# Patient Record
Sex: FEMALE | Race: WHITE | HISPANIC OR LATINO | Employment: OTHER | URBAN - METROPOLITAN AREA
[De-identification: names, ages, dates, MRNs, and addresses within clinical notes are randomized per-mention and may not be internally consistent; named-entity substitution may affect disease eponyms.]

---

## 2019-05-28 ENCOUNTER — HOSPITAL ENCOUNTER (EMERGENCY)
Facility: HOSPITAL | Age: 44
Discharge: HOME | End: 2019-05-28
Attending: EMERGENCY MEDICINE | Admitting: EMERGENCY MEDICINE

## 2019-05-28 ENCOUNTER — APPOINTMENT (OUTPATIENT)
Dept: CT IMAGING | Facility: HOSPITAL | Age: 44
End: 2019-05-28

## 2019-05-28 VITALS
HEART RATE: 67 BPM | OXYGEN SATURATION: 99 % | RESPIRATION RATE: 16 BRPM | DIASTOLIC BLOOD PRESSURE: 65 MMHG | WEIGHT: 135 LBS | SYSTOLIC BLOOD PRESSURE: 111 MMHG | TEMPERATURE: 97.7 F | HEIGHT: 57 IN | BODY MASS INDEX: 29.12 KG/M2

## 2019-05-28 DIAGNOSIS — D25.9 UTERINE LEIOMYOMA, UNSPECIFIED LOCATION: Primary | ICD-10-CM

## 2019-05-28 LAB
ALBUMIN SERPL-MCNC: 4.2 G/DL (ref 3.5–5.2)
ALBUMIN/GLOB SERPL: 1.6 G/DL
ALP SERPL-CCNC: 70 U/L (ref 39–117)
ALT SERPL W P-5'-P-CCNC: 35 U/L (ref 1–33)
ANION GAP SERPL CALCULATED.3IONS-SCNC: 11.2 MMOL/L
AST SERPL-CCNC: 33 U/L (ref 1–32)
BASOPHILS # BLD AUTO: 0.03 10*3/MM3 (ref 0–0.2)
BASOPHILS NFR BLD AUTO: 0.6 % (ref 0–1.5)
BILIRUB SERPL-MCNC: <0.2 MG/DL (ref 0.2–1.2)
BILIRUB UR QL STRIP: NEGATIVE
BUN BLD-MCNC: 10 MG/DL (ref 6–20)
BUN/CREAT SERPL: 14.3 (ref 7–25)
CALCIUM SPEC-SCNC: 8.9 MG/DL (ref 8.6–10.5)
CHLORIDE SERPL-SCNC: 104 MMOL/L (ref 98–107)
CLARITY UR: CLEAR
CO2 SERPL-SCNC: 24.8 MMOL/L (ref 22–29)
COLOR UR: YELLOW
CREAT BLD-MCNC: 0.7 MG/DL (ref 0.57–1)
DEPRECATED RDW RBC AUTO: 41.9 FL (ref 37–54)
EOSINOPHIL # BLD AUTO: 0.11 10*3/MM3 (ref 0–0.4)
EOSINOPHIL NFR BLD AUTO: 2.1 % (ref 0.3–6.2)
ERYTHROCYTE [DISTWIDTH] IN BLOOD BY AUTOMATED COUNT: 12.9 % (ref 12.3–15.4)
GFR SERPL CREATININE-BSD FRML MDRD: 90 ML/MIN/1.73
GLOBULIN UR ELPH-MCNC: 2.6 GM/DL
GLUCOSE BLD-MCNC: 97 MG/DL (ref 65–99)
GLUCOSE UR STRIP-MCNC: NEGATIVE MG/DL
HCG SERPL QL: NEGATIVE
HCT VFR BLD AUTO: 38.8 % (ref 34–46.6)
HGB BLD-MCNC: 12.7 G/DL (ref 12–15.9)
HGB UR QL STRIP.AUTO: NEGATIVE
IMM GRANULOCYTES # BLD AUTO: 0 10*3/MM3 (ref 0–0.05)
IMM GRANULOCYTES NFR BLD AUTO: 0 % (ref 0–0.5)
KETONES UR QL STRIP: NEGATIVE
LEUKOCYTE ESTERASE UR QL STRIP.AUTO: NEGATIVE
LIPASE SERPL-CCNC: 42 U/L (ref 13–60)
LYMPHOCYTES # BLD AUTO: 1.76 10*3/MM3 (ref 0.7–3.1)
LYMPHOCYTES NFR BLD AUTO: 33.8 % (ref 19.6–45.3)
MCH RBC QN AUTO: 28.6 PG (ref 26.6–33)
MCHC RBC AUTO-ENTMCNC: 32.7 G/DL (ref 31.5–35.7)
MCV RBC AUTO: 87.4 FL (ref 79–97)
MONOCYTES # BLD AUTO: 0.68 10*3/MM3 (ref 0.1–0.9)
MONOCYTES NFR BLD AUTO: 13.1 % (ref 5–12)
NEUTROPHILS # BLD AUTO: 2.62 10*3/MM3 (ref 1.7–7)
NEUTROPHILS NFR BLD AUTO: 50.4 % (ref 42.7–76)
NITRITE UR QL STRIP: NEGATIVE
NRBC BLD AUTO-RTO: 0 /100 WBC (ref 0–0.2)
PH UR STRIP.AUTO: 7 [PH] (ref 5–8)
PLATELET # BLD AUTO: 244 10*3/MM3 (ref 140–450)
PMV BLD AUTO: 11.1 FL (ref 6–12)
POTASSIUM BLD-SCNC: 3.8 MMOL/L (ref 3.5–5.2)
PROT SERPL-MCNC: 6.8 G/DL (ref 6–8.5)
PROT UR QL STRIP: NEGATIVE
RBC # BLD AUTO: 4.44 10*6/MM3 (ref 3.77–5.28)
SODIUM BLD-SCNC: 140 MMOL/L (ref 136–145)
SP GR UR STRIP: 1.02 (ref 1–1.03)
UROBILINOGEN UR QL STRIP: NORMAL
WBC NRBC COR # BLD: 5.2 10*3/MM3 (ref 3.4–10.8)

## 2019-05-28 PROCEDURE — 25010000002 MORPHINE PER 10 MG: Performed by: EMERGENCY MEDICINE

## 2019-05-28 PROCEDURE — 83690 ASSAY OF LIPASE: CPT | Performed by: EMERGENCY MEDICINE

## 2019-05-28 PROCEDURE — 84703 CHORIONIC GONADOTROPIN ASSAY: CPT | Performed by: EMERGENCY MEDICINE

## 2019-05-28 PROCEDURE — 96374 THER/PROPH/DIAG INJ IV PUSH: CPT

## 2019-05-28 PROCEDURE — 81003 URINALYSIS AUTO W/O SCOPE: CPT | Performed by: EMERGENCY MEDICINE

## 2019-05-28 PROCEDURE — 25010000002 IOPAMIDOL 61 % SOLUTION: Performed by: EMERGENCY MEDICINE

## 2019-05-28 PROCEDURE — 96375 TX/PRO/DX INJ NEW DRUG ADDON: CPT

## 2019-05-28 PROCEDURE — 25010000002 ONDANSETRON PER 1 MG: Performed by: EMERGENCY MEDICINE

## 2019-05-28 PROCEDURE — 85025 COMPLETE CBC W/AUTO DIFF WBC: CPT | Performed by: EMERGENCY MEDICINE

## 2019-05-28 PROCEDURE — 80053 COMPREHEN METABOLIC PANEL: CPT | Performed by: EMERGENCY MEDICINE

## 2019-05-28 PROCEDURE — 99283 EMERGENCY DEPT VISIT LOW MDM: CPT

## 2019-05-28 PROCEDURE — 74177 CT ABD & PELVIS W/CONTRAST: CPT

## 2019-05-28 RX ORDER — MORPHINE SULFATE 2 MG/ML
4 INJECTION, SOLUTION INTRAMUSCULAR; INTRAVENOUS ONCE
Status: COMPLETED | OUTPATIENT
Start: 2019-05-28 | End: 2019-05-28

## 2019-05-28 RX ORDER — HYDROCODONE BITARTRATE AND ACETAMINOPHEN 5; 325 MG/1; MG/1
1 TABLET ORAL 4 TIMES DAILY PRN
Qty: 12 TABLET | Refills: 0 | OUTPATIENT
Start: 2019-05-28 | End: 2019-06-22

## 2019-05-28 RX ORDER — SODIUM CHLORIDE 0.9 % (FLUSH) 0.9 %
10 SYRINGE (ML) INJECTION AS NEEDED
Status: DISCONTINUED | OUTPATIENT
Start: 2019-05-28 | End: 2019-05-29 | Stop reason: HOSPADM

## 2019-05-28 RX ORDER — ONDANSETRON 2 MG/ML
4 INJECTION INTRAMUSCULAR; INTRAVENOUS ONCE
Status: COMPLETED | OUTPATIENT
Start: 2019-05-28 | End: 2019-05-28

## 2019-05-28 RX ADMIN — ONDANSETRON HYDROCHLORIDE 4 MG: 2 SOLUTION INTRAMUSCULAR; INTRAVENOUS at 21:27

## 2019-05-28 RX ADMIN — SODIUM CHLORIDE 500 ML: 9 INJECTION, SOLUTION INTRAVENOUS at 20:59

## 2019-05-28 RX ADMIN — MORPHINE SULFATE 4 MG: 2 INJECTION, SOLUTION INTRAMUSCULAR; INTRAVENOUS at 21:27

## 2019-05-28 RX ADMIN — IOPAMIDOL 85 ML: 612 INJECTION, SOLUTION INTRAVENOUS at 22:05

## 2019-05-29 ENCOUNTER — TELEPHONE (OUTPATIENT)
Dept: OBSTETRICS AND GYNECOLOGY | Age: 44
End: 2019-05-29

## 2019-06-21 ENCOUNTER — HOSPITAL ENCOUNTER (EMERGENCY)
Facility: HOSPITAL | Age: 44
Discharge: HOME | End: 2019-06-22
Attending: EMERGENCY MEDICINE | Admitting: EMERGENCY MEDICINE

## 2019-06-21 DIAGNOSIS — R10.84 GENERALIZED ABDOMINAL PAIN: Primary | ICD-10-CM

## 2019-06-21 DIAGNOSIS — K59.00 CONSTIPATION, UNSPECIFIED CONSTIPATION TYPE: ICD-10-CM

## 2019-06-21 LAB
ALBUMIN SERPL-MCNC: 4.3 G/DL (ref 3.5–5.2)
ALBUMIN/GLOB SERPL: 1.5 G/DL
ALP SERPL-CCNC: 63 U/L (ref 39–117)
ALT SERPL W P-5'-P-CCNC: 34 U/L (ref 1–33)
ANION GAP SERPL CALCULATED.3IONS-SCNC: 10.4 MMOL/L
AST SERPL-CCNC: 37 U/L (ref 1–32)
BASOPHILS # BLD AUTO: 0.03 10*3/MM3 (ref 0–0.2)
BASOPHILS NFR BLD AUTO: 0.3 % (ref 0–1.5)
BILIRUB SERPL-MCNC: 0.2 MG/DL (ref 0.2–1.2)
BUN BLD-MCNC: 9 MG/DL (ref 6–20)
BUN/CREAT SERPL: 9.6 (ref 7–25)
CALCIUM SPEC-SCNC: 9.1 MG/DL (ref 8.6–10.5)
CHLORIDE SERPL-SCNC: 102 MMOL/L (ref 98–107)
CO2 SERPL-SCNC: 25.6 MMOL/L (ref 22–29)
CREAT BLD-MCNC: 0.94 MG/DL (ref 0.57–1)
DEPRECATED RDW RBC AUTO: 40.6 FL (ref 37–54)
EOSINOPHIL # BLD AUTO: 0.03 10*3/MM3 (ref 0–0.4)
EOSINOPHIL NFR BLD AUTO: 0.3 % (ref 0.3–6.2)
ERYTHROCYTE [DISTWIDTH] IN BLOOD BY AUTOMATED COUNT: 12.6 % (ref 12.3–15.4)
GFR SERPL CREATININE-BSD FRML MDRD: 64 ML/MIN/1.73
GLOBULIN UR ELPH-MCNC: 2.8 GM/DL
GLUCOSE BLD-MCNC: 131 MG/DL (ref 65–99)
HCG SERPL QL: NEGATIVE
HCT VFR BLD AUTO: 38.5 % (ref 34–46.6)
HGB BLD-MCNC: 12.6 G/DL (ref 12–15.9)
IMM GRANULOCYTES # BLD AUTO: 0.04 10*3/MM3 (ref 0–0.05)
IMM GRANULOCYTES NFR BLD AUTO: 0.4 % (ref 0–0.5)
LIPASE SERPL-CCNC: 38 U/L (ref 13–60)
LYMPHOCYTES # BLD AUTO: 0.97 10*3/MM3 (ref 0.7–3.1)
LYMPHOCYTES NFR BLD AUTO: 8.8 % (ref 19.6–45.3)
MCH RBC QN AUTO: 28.6 PG (ref 26.6–33)
MCHC RBC AUTO-ENTMCNC: 32.7 G/DL (ref 31.5–35.7)
MCV RBC AUTO: 87.3 FL (ref 79–97)
MONOCYTES # BLD AUTO: 0.35 10*3/MM3 (ref 0.1–0.9)
MONOCYTES NFR BLD AUTO: 3.2 % (ref 5–12)
NEUTROPHILS # BLD AUTO: 9.66 10*3/MM3 (ref 1.7–7)
NEUTROPHILS NFR BLD AUTO: 87 % (ref 42.7–76)
NRBC BLD AUTO-RTO: 0 /100 WBC (ref 0–0.2)
PLATELET # BLD AUTO: 287 10*3/MM3 (ref 140–450)
PMV BLD AUTO: 10.9 FL (ref 6–12)
POTASSIUM BLD-SCNC: 4.1 MMOL/L (ref 3.5–5.2)
PROT SERPL-MCNC: 7.1 G/DL (ref 6–8.5)
RBC # BLD AUTO: 4.41 10*6/MM3 (ref 3.77–5.28)
SODIUM BLD-SCNC: 138 MMOL/L (ref 136–145)
WBC NRBC COR # BLD: 11.08 10*3/MM3 (ref 3.4–10.8)

## 2019-06-21 PROCEDURE — 99283 EMERGENCY DEPT VISIT LOW MDM: CPT

## 2019-06-21 PROCEDURE — 96376 TX/PRO/DX INJ SAME DRUG ADON: CPT

## 2019-06-21 PROCEDURE — 96375 TX/PRO/DX INJ NEW DRUG ADDON: CPT

## 2019-06-21 PROCEDURE — 84703 CHORIONIC GONADOTROPIN ASSAY: CPT | Performed by: PHYSICIAN ASSISTANT

## 2019-06-21 PROCEDURE — 85025 COMPLETE CBC W/AUTO DIFF WBC: CPT | Performed by: PHYSICIAN ASSISTANT

## 2019-06-21 PROCEDURE — 83690 ASSAY OF LIPASE: CPT | Performed by: PHYSICIAN ASSISTANT

## 2019-06-21 PROCEDURE — 80053 COMPREHEN METABOLIC PANEL: CPT | Performed by: PHYSICIAN ASSISTANT

## 2019-06-21 PROCEDURE — 96374 THER/PROPH/DIAG INJ IV PUSH: CPT

## 2019-06-21 PROCEDURE — 25010000002 ONDANSETRON PER 1 MG: Performed by: EMERGENCY MEDICINE

## 2019-06-21 PROCEDURE — 81001 URINALYSIS AUTO W/SCOPE: CPT | Performed by: PHYSICIAN ASSISTANT

## 2019-06-21 PROCEDURE — 25010000002 HYDROMORPHONE 1 MG/ML SOLUTION: Performed by: EMERGENCY MEDICINE

## 2019-06-21 PROCEDURE — 87086 URINE CULTURE/COLONY COUNT: CPT | Performed by: PHYSICIAN ASSISTANT

## 2019-06-21 RX ORDER — IBUPROFEN 200 MG
200 TABLET ORAL EVERY 6 HOURS PRN
COMMUNITY
End: 2022-09-16 | Stop reason: ALTCHOICE

## 2019-06-21 RX ORDER — SODIUM CHLORIDE 0.9 % (FLUSH) 0.9 %
10 SYRINGE (ML) INJECTION AS NEEDED
Status: DISCONTINUED | OUTPATIENT
Start: 2019-06-21 | End: 2019-06-22 | Stop reason: HOSPADM

## 2019-06-21 RX ORDER — ONDANSETRON 2 MG/ML
4 INJECTION INTRAMUSCULAR; INTRAVENOUS ONCE
Status: COMPLETED | OUTPATIENT
Start: 2019-06-21 | End: 2019-06-21

## 2019-06-21 RX ADMIN — SODIUM CHLORIDE 1000 ML: 9 INJECTION, SOLUTION INTRAVENOUS at 22:50

## 2019-06-21 RX ADMIN — HYDROMORPHONE HYDROCHLORIDE 1 MG: 1 INJECTION, SOLUTION INTRAMUSCULAR; INTRAVENOUS; SUBCUTANEOUS at 22:54

## 2019-06-21 RX ADMIN — ONDANSETRON 4 MG: 2 INJECTION INTRAMUSCULAR; INTRAVENOUS at 22:53

## 2019-06-22 ENCOUNTER — APPOINTMENT (OUTPATIENT)
Dept: CT IMAGING | Facility: HOSPITAL | Age: 44
End: 2019-06-22

## 2019-06-22 VITALS
DIASTOLIC BLOOD PRESSURE: 59 MMHG | RESPIRATION RATE: 16 BRPM | TEMPERATURE: 97.6 F | WEIGHT: 148.7 LBS | BODY MASS INDEX: 29.19 KG/M2 | OXYGEN SATURATION: 94 % | SYSTOLIC BLOOD PRESSURE: 109 MMHG | HEART RATE: 83 BPM | HEIGHT: 60 IN

## 2019-06-22 LAB
BACTERIA UR QL AUTO: ABNORMAL /HPF
BILIRUB UR QL STRIP: NEGATIVE
CLARITY UR: CLEAR
COLOR UR: YELLOW
GLUCOSE UR STRIP-MCNC: NEGATIVE MG/DL
HGB UR QL STRIP.AUTO: ABNORMAL
HYALINE CASTS UR QL AUTO: ABNORMAL /LPF
KETONES UR QL STRIP: NEGATIVE
LEUKOCYTE ESTERASE UR QL STRIP.AUTO: ABNORMAL
NITRITE UR QL STRIP: NEGATIVE
PH UR STRIP.AUTO: 7 [PH] (ref 5–8)
PROT UR QL STRIP: NEGATIVE
RBC # UR: ABNORMAL /HPF
REF LAB TEST METHOD: ABNORMAL
SP GR UR STRIP: 1.01 (ref 1–1.03)
SQUAMOUS #/AREA URNS HPF: ABNORMAL /HPF
UROBILINOGEN UR QL STRIP: ABNORMAL
WBC UR QL AUTO: ABNORMAL /HPF

## 2019-06-22 PROCEDURE — 25010000002 IOPAMIDOL 61 % SOLUTION: Performed by: EMERGENCY MEDICINE

## 2019-06-22 PROCEDURE — 25010000002 PROMETHAZINE PER 50 MG: Performed by: EMERGENCY MEDICINE

## 2019-06-22 PROCEDURE — 74177 CT ABD & PELVIS W/CONTRAST: CPT

## 2019-06-22 PROCEDURE — 96375 TX/PRO/DX INJ NEW DRUG ADDON: CPT

## 2019-06-22 PROCEDURE — 25010000002 ONDANSETRON PER 1 MG: Performed by: PHYSICIAN ASSISTANT

## 2019-06-22 RX ORDER — PROMETHAZINE HYDROCHLORIDE 25 MG/ML
12.5 INJECTION, SOLUTION INTRAMUSCULAR; INTRAVENOUS ONCE
Status: COMPLETED | OUTPATIENT
Start: 2019-06-22 | End: 2019-06-22

## 2019-06-22 RX ORDER — POLYETHYLENE GLYCOL 3350 17 G/17G
17 POWDER, FOR SOLUTION ORAL DAILY
Qty: 500 G | Refills: 0 | Status: SHIPPED | OUTPATIENT
Start: 2019-06-22 | End: 2022-09-16 | Stop reason: ALTCHOICE

## 2019-06-22 RX ORDER — ONDANSETRON 2 MG/ML
4 INJECTION INTRAMUSCULAR; INTRAVENOUS ONCE
Status: COMPLETED | OUTPATIENT
Start: 2019-06-22 | End: 2019-06-22

## 2019-06-22 RX ADMIN — PROMETHAZINE HYDROCHLORIDE 12.5 MG: 25 INJECTION INTRAMUSCULAR; INTRAVENOUS at 01:34

## 2019-06-22 RX ADMIN — ONDANSETRON 4 MG: 2 INJECTION INTRAMUSCULAR; INTRAVENOUS at 00:54

## 2019-06-22 RX ADMIN — IOPAMIDOL 85 ML: 612 INJECTION, SOLUTION INTRAVENOUS at 00:11

## 2019-06-23 LAB — BACTERIA SPEC AEROBE CULT: NORMAL

## 2021-02-15 ENCOUNTER — TELEPHONE (OUTPATIENT)
Dept: GASTROENTEROLOGY | Facility: CLINIC | Age: 46
End: 2021-02-15

## 2021-03-08 ENCOUNTER — OFFICE VISIT (OUTPATIENT)
Dept: GASTROENTEROLOGY | Facility: CLINIC | Age: 46
End: 2021-03-08

## 2021-03-08 VITALS — HEIGHT: 60 IN | WEIGHT: 143.8 LBS | BODY MASS INDEX: 28.23 KG/M2 | TEMPERATURE: 98 F

## 2021-03-08 DIAGNOSIS — Z86.19 HISTORY OF HELICOBACTER PYLORI INFECTION: ICD-10-CM

## 2021-03-08 DIAGNOSIS — R10.13 EPIGASTRIC PAIN: Primary | ICD-10-CM

## 2021-03-08 DIAGNOSIS — R11.0 NAUSEA: ICD-10-CM

## 2021-03-08 PROCEDURE — 99204 OFFICE O/P NEW MOD 45 MIN: CPT | Performed by: NURSE PRACTITIONER

## 2021-03-08 RX ORDER — PANTOPRAZOLE SODIUM 40 MG/1
40 TABLET, DELAYED RELEASE ORAL 2 TIMES DAILY
Qty: 60 TABLET | Refills: 5 | Status: SHIPPED | OUTPATIENT
Start: 2021-03-08 | End: 2021-04-29

## 2021-03-08 RX ORDER — ONDANSETRON 4 MG/1
4 TABLET, ORALLY DISINTEGRATING ORAL EVERY 8 HOURS PRN
Qty: 25 TABLET | Refills: 1 | Status: SHIPPED | OUTPATIENT
Start: 2021-03-08 | End: 2021-04-29

## 2021-03-08 NOTE — H&P (VIEW-ONLY)
Chief Complaint   Patient presents with   • Abdominal Pain     epigastic pain   • Nausea     HPI    Jeanie Cortez is a  46 y.o. female here to establish care as a new patient for epigastric pain and nausea.  This patient will also follow with Dr. Holden.  Past medical history of H. pylori ulcer and hyperlipidemia. (Patient seen today accompanied by her son who translates.)    This patient is seeking a second opinion.  She has been treated at [facility] and per the patient has completed 3 rounds of antibiotic therapy for H pylori.  She became dissatisfied with her care there and sought out our services.  She reports having an EGD almost 1 year ago as well as a colonoscopy.  Unfortunately no records available for review.  Even with treatment for H. pylori she has had little improvement in her symptoms.  She currently describes 2 months of worsening epigastric pain and nausea but no vomiting.  No dysphagia or odynophagia.  No weight loss.  She is currently taking omeprazole OTC.    No diarrhea, constipation, or rectal bleeding.    She does not smoke.  She rarely drinks alcohol.  She avoids NSAIDs.    She denies family history of colon cancer.    Past Medical History:   Diagnosis Date   • H pylori ulcer    • Hyperlipidemia        History reviewed. No pertinent surgical history.    Scheduled Meds:  Outpatient Encounter Medications as of 3/8/2021   Medication Sig Dispense Refill   • ondansetron ODT (Zofran ODT) 4 MG disintegrating tablet Place 1 tablet on the tongue Every 8 (Eight) Hours As Needed for Nausea or Vomiting. 25 tablet 1   • pantoprazole (PROTONIX) 40 MG EC tablet Take 1 tablet by mouth 2 (Two) Times a Day. 60 tablet 5     No facility-administered encounter medications on file as of 3/8/2021.       Continuous Infusions:No current facility-administered medications for this visit.      PRN Meds:.    No Known Allergies    Social History     Socioeconomic History   • Marital status: Single      Spouse name: Not on file   • Number of children: Not on file   • Years of education: Not on file   • Highest education level: Not on file   Tobacco Use   • Smoking status: Never Smoker   • Smokeless tobacco: Never Used   Substance and Sexual Activity   • Alcohol use: Yes     Comment: 1 glass per month   • Drug use: Never       Family History   Problem Relation Age of Onset   • Throat cancer Father    • Lung cancer Sister        Review of Systems   Constitutional: Negative for activity change, appetite change, fatigue, fever and unexpected weight change.   HENT: Negative for trouble swallowing and voice change.    Eyes: Negative.    Respiratory: Negative for apnea, cough, choking, chest tightness, shortness of breath and wheezing.    Cardiovascular: Negative for chest pain, palpitations and leg swelling.   Gastrointestinal: Positive for abdominal pain and nausea. Negative for abdominal distention, anal bleeding, blood in stool, constipation, diarrhea, rectal pain and vomiting.   Endocrine: Negative.    Genitourinary: Negative.    Musculoskeletal: Negative.    Skin: Negative.    Allergic/Immunologic: Negative.    Neurological: Negative.    Hematological: Negative.    Psychiatric/Behavioral: Negative.        Vitals:    03/08/21 1323   Temp: 98 °F (36.7 °C)       Physical Exam  Constitutional:       Appearance: She is well-developed.   HENT:      Head: Normocephalic.      Nose: Nose normal.   Eyes:      Pupils: Pupils are equal, round, and reactive to light.   Cardiovascular:      Rate and Rhythm: Normal rate and regular rhythm.      Heart sounds: Normal heart sounds.   Pulmonary:      Effort: Pulmonary effort is normal. No respiratory distress.      Breath sounds: Normal breath sounds. No wheezing.   Abdominal:      General: Bowel sounds are normal. There is no distension.      Palpations: Abdomen is soft. There is no mass.      Tenderness: There is no abdominal tenderness. There is no guarding.      Hernia: No hernia  is present.   Musculoskeletal:         General: Normal range of motion.      Cervical back: Normal range of motion.   Skin:     General: Skin is warm and dry.      Capillary Refill: Capillary refill takes less than 2 seconds.   Neurological:      Mental Status: She is alert and oriented to person, place, and time.   Psychiatric:         Behavior: Behavior normal.     Assessment    Epigastric pain  Nausea  History of H. pylori    Plan    Pleasant Icelandic speaking female seen today accompanied by her son who helps translate to establish care as a new patient seeking a second opinion as mentioned above.  She still having issues with epigastric pain and nausea despite daily PPI therapy.  She also has a history of H. pylori treated on 3 occasions over the past year.      At this point recommend stopping her current PPI and transitioning her to Protonix p.o. twice daily.  Zofran provided for relief of nausea in the interim.  Arrange EGD with Dr. Holden for further evaluation.  If she still H. pylori positive we may need to consider referral to infectious disease.  If EGD and pathology found to be unremarkable consider gallbladder evaluation.  I counseled the patient antireflux diet as well as the need to avoid NSAIDs.    Obtain a copy of her last office visit at [facility] as well as her last procedure reports and any pathology taken.    Follow-up and further recommendations pending the aforementioned work-up.

## 2021-03-08 NOTE — PROGRESS NOTES
Chief Complaint   Patient presents with   • Abdominal Pain     epigastic pain   • Nausea     HPI    Jeanie Cortez is a  46 y.o. female here to establish care as a new patient for epigastric pain and nausea.  This patient will also follow with Dr. Holden.  Past medical history of H. pylori ulcer and hyperlipidemia. (Patient seen today accompanied by her son who translates.)    This patient is seeking a second opinion.  She has been treated at [facility] and per the patient has completed 3 rounds of antibiotic therapy for H pylori.  She became dissatisfied with her care there and sought out our services.  She reports having an EGD almost 1 year ago as well as a colonoscopy.  Unfortunately no records available for review.  Even with treatment for H. pylori she has had little improvement in her symptoms.  She currently describes 2 months of worsening epigastric pain and nausea but no vomiting.  No dysphagia or odynophagia.  No weight loss.  She is currently taking omeprazole OTC.    No diarrhea, constipation, or rectal bleeding.    She does not smoke.  She rarely drinks alcohol.  She avoids NSAIDs.    She denies family history of colon cancer.    Past Medical History:   Diagnosis Date   • H pylori ulcer    • Hyperlipidemia        History reviewed. No pertinent surgical history.    Scheduled Meds:  Outpatient Encounter Medications as of 3/8/2021   Medication Sig Dispense Refill   • ondansetron ODT (Zofran ODT) 4 MG disintegrating tablet Place 1 tablet on the tongue Every 8 (Eight) Hours As Needed for Nausea or Vomiting. 25 tablet 1   • pantoprazole (PROTONIX) 40 MG EC tablet Take 1 tablet by mouth 2 (Two) Times a Day. 60 tablet 5     No facility-administered encounter medications on file as of 3/8/2021.       Continuous Infusions:No current facility-administered medications for this visit.      PRN Meds:.    No Known Allergies    Social History     Socioeconomic History   • Marital status: Single      Spouse name: Not on file   • Number of children: Not on file   • Years of education: Not on file   • Highest education level: Not on file   Tobacco Use   • Smoking status: Never Smoker   • Smokeless tobacco: Never Used   Substance and Sexual Activity   • Alcohol use: Yes     Comment: 1 glass per month   • Drug use: Never       Family History   Problem Relation Age of Onset   • Throat cancer Father    • Lung cancer Sister        Review of Systems   Constitutional: Negative for activity change, appetite change, fatigue, fever and unexpected weight change.   HENT: Negative for trouble swallowing and voice change.    Eyes: Negative.    Respiratory: Negative for apnea, cough, choking, chest tightness, shortness of breath and wheezing.    Cardiovascular: Negative for chest pain, palpitations and leg swelling.   Gastrointestinal: Positive for abdominal pain and nausea. Negative for abdominal distention, anal bleeding, blood in stool, constipation, diarrhea, rectal pain and vomiting.   Endocrine: Negative.    Genitourinary: Negative.    Musculoskeletal: Negative.    Skin: Negative.    Allergic/Immunologic: Negative.    Neurological: Negative.    Hematological: Negative.    Psychiatric/Behavioral: Negative.        Vitals:    03/08/21 1323   Temp: 98 °F (36.7 °C)       Physical Exam  Constitutional:       Appearance: She is well-developed.   HENT:      Head: Normocephalic.      Nose: Nose normal.   Eyes:      Pupils: Pupils are equal, round, and reactive to light.   Cardiovascular:      Rate and Rhythm: Normal rate and regular rhythm.      Heart sounds: Normal heart sounds.   Pulmonary:      Effort: Pulmonary effort is normal. No respiratory distress.      Breath sounds: Normal breath sounds. No wheezing.   Abdominal:      General: Bowel sounds are normal. There is no distension.      Palpations: Abdomen is soft. There is no mass.      Tenderness: There is no abdominal tenderness. There is no guarding.      Hernia: No hernia  is present.   Musculoskeletal:         General: Normal range of motion.      Cervical back: Normal range of motion.   Skin:     General: Skin is warm and dry.      Capillary Refill: Capillary refill takes less than 2 seconds.   Neurological:      Mental Status: She is alert and oriented to person, place, and time.   Psychiatric:         Behavior: Behavior normal.     Assessment    Epigastric pain  Nausea  History of H. pylori    Plan    Pleasant Slovenian speaking female seen today accompanied by her son who helps translate to establish care as a new patient seeking a second opinion as mentioned above.  She still having issues with epigastric pain and nausea despite daily PPI therapy.  She also has a history of H. pylori treated on 3 occasions over the past year.      At this point recommend stopping her current PPI and transitioning her to Protonix p.o. twice daily.  Zofran provided for relief of nausea in the interim.  Arrange EGD with Dr. Holden for further evaluation.  If she still H. pylori positive we may need to consider referral to infectious disease.  If EGD and pathology found to be unremarkable consider gallbladder evaluation.  I counseled the patient antireflux diet as well as the need to avoid NSAIDs.    Obtain a copy of her last office visit at [facility] as well as her last procedure reports and any pathology taken.    Follow-up and further recommendations pending the aforementioned work-up.

## 2021-03-09 ENCOUNTER — TELEPHONE (OUTPATIENT)
Dept: GASTROENTEROLOGY | Facility: CLINIC | Age: 46
End: 2021-03-09

## 2021-03-09 LAB
ALBUMIN SERPL-MCNC: 4.4 G/DL (ref 3.5–5.2)
ALBUMIN/GLOB SERPL: 1.7 G/DL
ALP SERPL-CCNC: 68 U/L (ref 39–117)
ALT SERPL-CCNC: 28 U/L (ref 1–33)
AST SERPL-CCNC: 23 U/L (ref 1–32)
BASOPHILS # BLD AUTO: 0.03 10*3/MM3 (ref 0–0.2)
BASOPHILS NFR BLD AUTO: 0.4 % (ref 0–1.5)
BILIRUB SERPL-MCNC: <0.2 MG/DL (ref 0–1.2)
BUN SERPL-MCNC: 9 MG/DL (ref 6–20)
BUN/CREAT SERPL: 14.5 (ref 7–25)
CALCIUM SERPL-MCNC: 9.1 MG/DL (ref 8.6–10.5)
CHLORIDE SERPL-SCNC: 105 MMOL/L (ref 98–107)
CO2 SERPL-SCNC: 25.8 MMOL/L (ref 22–29)
CREAT SERPL-MCNC: 0.62 MG/DL (ref 0.57–1)
ENDOMYSIUM IGA SER QL: NEGATIVE
EOSINOPHIL # BLD AUTO: 0.1 10*3/MM3 (ref 0–0.4)
EOSINOPHIL NFR BLD AUTO: 1.5 % (ref 0.3–6.2)
ERYTHROCYTE [DISTWIDTH] IN BLOOD BY AUTOMATED COUNT: 13.2 % (ref 12.3–15.4)
GLIADIN PEPTIDE IGA SER-ACNC: 4 UNITS (ref 0–19)
GLIADIN PEPTIDE IGG SER-ACNC: 4 UNITS (ref 0–19)
GLOBULIN SER CALC-MCNC: 2.6 GM/DL
GLUCOSE SERPL-MCNC: 82 MG/DL (ref 65–99)
HCT VFR BLD AUTO: 40.8 % (ref 34–46.6)
HGB BLD-MCNC: 13.3 G/DL (ref 12–15.9)
IGA SERPL-MCNC: 235 MG/DL (ref 87–352)
IMM GRANULOCYTES # BLD AUTO: 0.02 10*3/MM3 (ref 0–0.05)
IMM GRANULOCYTES NFR BLD AUTO: 0.3 % (ref 0–0.5)
LIPASE SERPL-CCNC: 38 U/L (ref 13–60)
LYMPHOCYTES # BLD AUTO: 1.97 10*3/MM3 (ref 0.7–3.1)
LYMPHOCYTES NFR BLD AUTO: 28.7 % (ref 19.6–45.3)
MCH RBC QN AUTO: 29 PG (ref 26.6–33)
MCHC RBC AUTO-ENTMCNC: 32.6 G/DL (ref 31.5–35.7)
MCV RBC AUTO: 89.1 FL (ref 79–97)
MONOCYTES # BLD AUTO: 0.52 10*3/MM3 (ref 0.1–0.9)
MONOCYTES NFR BLD AUTO: 7.6 % (ref 5–12)
NEUTROPHILS # BLD AUTO: 4.22 10*3/MM3 (ref 1.7–7)
NEUTROPHILS NFR BLD AUTO: 61.5 % (ref 42.7–76)
NRBC BLD AUTO-RTO: 0 /100 WBC (ref 0–0.2)
PLATELET # BLD AUTO: 284 10*3/MM3 (ref 140–450)
POTASSIUM SERPL-SCNC: 4.6 MMOL/L (ref 3.5–5.2)
PROT SERPL-MCNC: 7 G/DL (ref 6–8.5)
RBC # BLD AUTO: 4.58 10*6/MM3 (ref 3.77–5.28)
SODIUM SERPL-SCNC: 139 MMOL/L (ref 136–145)
TTG IGA SER-ACNC: <2 U/ML (ref 0–3)
TTG IGG SER-ACNC: 4 U/ML (ref 0–5)
WBC # BLD AUTO: 6.86 10*3/MM3 (ref 3.4–10.8)

## 2021-03-09 NOTE — TELEPHONE ENCOUNTER
Mally Billings, EJ  [facility] Clinical 2 Pool  Please inform the patient her labs are normal.

## 2021-03-09 NOTE — TELEPHONE ENCOUNTER
Attempted to call patient at both phone numbers. [phone] states the call cannot be completed and [phone] is a wrong number. A letter has been mailed to the patient.

## 2021-03-12 NOTE — TELEPHONE ENCOUNTER
Neena Rosado RegSched Rep  [facility] Clinical 2 [place]  Phone Number: [phone]  PT is calling for results     Called pt using [employer]  #[phone] and reached a vm that was full.

## 2021-03-18 ENCOUNTER — TRANSCRIBE ORDERS (OUTPATIENT)
Dept: SLEEP MEDICINE | Facility: HOSPITAL | Age: 46
End: 2021-03-18

## 2021-03-18 DIAGNOSIS — Z01.818 OTHER SPECIFIED PRE-OPERATIVE EXAMINATION: Primary | ICD-10-CM

## 2021-03-19 ENCOUNTER — LAB (OUTPATIENT)
Dept: LAB | Facility: HOSPITAL | Age: 46
End: 2021-03-19

## 2021-03-19 DIAGNOSIS — Z01.818 OTHER SPECIFIED PRE-OPERATIVE EXAMINATION: ICD-10-CM

## 2021-03-19 PROCEDURE — C9803 HOPD COVID-19 SPEC COLLECT: HCPCS

## 2021-03-19 PROCEDURE — U0004 COV-19 TEST NON-CDC HGH THRU: HCPCS

## 2021-03-20 LAB — SARS-COV-2 ORF1AB RESP QL NAA+PROBE: NOT DETECTED

## 2021-03-22 ENCOUNTER — ANESTHESIA (OUTPATIENT)
Dept: GASTROENTEROLOGY | Facility: HOSPITAL | Age: 46
End: 2021-03-22

## 2021-03-22 ENCOUNTER — ANESTHESIA EVENT (OUTPATIENT)
Dept: GASTROENTEROLOGY | Facility: HOSPITAL | Age: 46
End: 2021-03-22

## 2021-03-22 ENCOUNTER — APPOINTMENT (OUTPATIENT)
Dept: CT IMAGING | Facility: HOSPITAL | Age: 46
End: 2021-03-22

## 2021-03-22 ENCOUNTER — HOSPITAL ENCOUNTER (EMERGENCY)
Facility: HOSPITAL | Age: 46
Discharge: HOME | End: 2021-03-23
Attending: EMERGENCY MEDICINE | Admitting: EMERGENCY MEDICINE

## 2021-03-22 ENCOUNTER — HOSPITAL ENCOUNTER (OUTPATIENT)
Facility: HOSPITAL | Age: 46
Setting detail: HOSPITAL OUTPATIENT SURGERY
Discharge: HOME | End: 2021-03-22
Attending: INTERNAL MEDICINE | Admitting: INTERNAL MEDICINE

## 2021-03-22 VITALS
BODY MASS INDEX: 27.88 KG/M2 | HEART RATE: 71 BPM | WEIGHT: 142 LBS | OXYGEN SATURATION: 100 % | SYSTOLIC BLOOD PRESSURE: 109 MMHG | DIASTOLIC BLOOD PRESSURE: 72 MMHG | RESPIRATION RATE: 16 BRPM | HEIGHT: 60 IN

## 2021-03-22 DIAGNOSIS — Z98.890 HISTORY OF ESOPHAGOGASTRODUODENOSCOPY (EGD): ICD-10-CM

## 2021-03-22 DIAGNOSIS — Z86.19 HISTORY OF HELICOBACTER PYLORI INFECTION: ICD-10-CM

## 2021-03-22 DIAGNOSIS — R10.13 EPIGASTRIC PAIN: ICD-10-CM

## 2021-03-22 DIAGNOSIS — R10.13 ABDOMINAL PAIN, EPIGASTRIC: Primary | ICD-10-CM

## 2021-03-22 DIAGNOSIS — R11.0 NAUSEA: ICD-10-CM

## 2021-03-22 LAB
ALBUMIN SERPL-MCNC: 4.2 G/DL (ref 3.5–5.2)
ALBUMIN/GLOB SERPL: 1.6 G/DL
ALP SERPL-CCNC: 69 U/L (ref 39–117)
ALT SERPL W P-5'-P-CCNC: 16 U/L (ref 1–33)
ANION GAP SERPL CALCULATED.3IONS-SCNC: 7.4 MMOL/L (ref 5–15)
AST SERPL-CCNC: 18 U/L (ref 1–32)
B-HCG UR QL: NEGATIVE
BACTERIA UR QL AUTO: ABNORMAL /HPF
BASOPHILS # BLD AUTO: 0.05 10*3/MM3 (ref 0–0.2)
BASOPHILS NFR BLD AUTO: 0.6 % (ref 0–1.5)
BILIRUB SERPL-MCNC: <0.2 MG/DL (ref 0–1.2)
BILIRUB UR QL STRIP: NEGATIVE
BUN SERPL-MCNC: 8 MG/DL (ref 6–20)
BUN/CREAT SERPL: 11.9 (ref 7–25)
CALCIUM SPEC-SCNC: 8.9 MG/DL (ref 8.6–10.5)
CHLORIDE SERPL-SCNC: 106 MMOL/L (ref 98–107)
CLARITY UR: CLEAR
CO2 SERPL-SCNC: 25.6 MMOL/L (ref 22–29)
COLOR UR: YELLOW
CREAT SERPL-MCNC: 0.67 MG/DL (ref 0.57–1)
DEPRECATED RDW RBC AUTO: 38.8 FL (ref 37–54)
EOSINOPHIL # BLD AUTO: 0.09 10*3/MM3 (ref 0–0.4)
EOSINOPHIL NFR BLD AUTO: 1.1 % (ref 0.3–6.2)
ERYTHROCYTE [DISTWIDTH] IN BLOOD BY AUTOMATED COUNT: 12.7 % (ref 12.3–15.4)
GFR SERPL CREATININE-BSD FRML MDRD: 94 ML/MIN/1.73
GLOBULIN UR ELPH-MCNC: 2.6 GM/DL
GLUCOSE SERPL-MCNC: 92 MG/DL (ref 65–99)
GLUCOSE UR STRIP-MCNC: NEGATIVE MG/DL
HCG SERPL QL: NEGATIVE
HCT VFR BLD AUTO: 38.5 % (ref 34–46.6)
HGB BLD-MCNC: 13.2 G/DL (ref 12–15.9)
HGB UR QL STRIP.AUTO: ABNORMAL
HOLD SPECIMEN: NORMAL
HYALINE CASTS UR QL AUTO: ABNORMAL /LPF
IMM GRANULOCYTES # BLD AUTO: 0.02 10*3/MM3 (ref 0–0.05)
IMM GRANULOCYTES NFR BLD AUTO: 0.3 % (ref 0–0.5)
INTERNAL NEGATIVE CONTROL: NEGATIVE
INTERNAL POSITIVE CONTROL: POSITIVE
KETONES UR QL STRIP: NEGATIVE
LEUKOCYTE ESTERASE UR QL STRIP.AUTO: ABNORMAL
LIPASE SERPL-CCNC: 42 U/L (ref 13–60)
LYMPHOCYTES # BLD AUTO: 2.1 10*3/MM3 (ref 0.7–3.1)
LYMPHOCYTES NFR BLD AUTO: 26.5 % (ref 19.6–45.3)
Lab: NORMAL
MCH RBC QN AUTO: 29.1 PG (ref 26.6–33)
MCHC RBC AUTO-ENTMCNC: 34.3 G/DL (ref 31.5–35.7)
MCV RBC AUTO: 85 FL (ref 79–97)
MONOCYTES # BLD AUTO: 0.55 10*3/MM3 (ref 0.1–0.9)
MONOCYTES NFR BLD AUTO: 6.9 % (ref 5–12)
NEUTROPHILS NFR BLD AUTO: 5.11 10*3/MM3 (ref 1.7–7)
NEUTROPHILS NFR BLD AUTO: 64.6 % (ref 42.7–76)
NITRITE UR QL STRIP: NEGATIVE
NRBC BLD AUTO-RTO: 0 /100 WBC (ref 0–0.2)
PH UR STRIP.AUTO: 8 [PH] (ref 5–8)
PLATELET # BLD AUTO: 304 10*3/MM3 (ref 140–450)
PMV BLD AUTO: 10.2 FL (ref 6–12)
POTASSIUM SERPL-SCNC: 4.4 MMOL/L (ref 3.5–5.2)
PROT SERPL-MCNC: 6.8 G/DL (ref 6–8.5)
PROT UR QL STRIP: NEGATIVE
QT INTERVAL: 373 MS
RBC # BLD AUTO: 4.53 10*6/MM3 (ref 3.77–5.28)
RBC # UR: ABNORMAL /HPF
REF LAB TEST METHOD: ABNORMAL
SODIUM SERPL-SCNC: 139 MMOL/L (ref 136–145)
SP GR UR STRIP: 1.02 (ref 1–1.03)
SQUAMOUS #/AREA URNS HPF: ABNORMAL /HPF
TROPONIN T SERPL-MCNC: <0.01 NG/ML (ref 0–0.03)
UROBILINOGEN UR QL STRIP: ABNORMAL
WBC # BLD AUTO: 7.92 10*3/MM3 (ref 3.4–10.8)
WBC UR QL AUTO: ABNORMAL /HPF
WHOLE BLOOD HOLD SPECIMEN: NORMAL
WHOLE BLOOD HOLD SPECIMEN: NORMAL

## 2021-03-22 PROCEDURE — 71260 CT THORAX DX C+: CPT

## 2021-03-22 PROCEDURE — 93005 ELECTROCARDIOGRAM TRACING: CPT | Performed by: EMERGENCY MEDICINE

## 2021-03-22 PROCEDURE — 84484 ASSAY OF TROPONIN QUANT: CPT | Performed by: EMERGENCY MEDICINE

## 2021-03-22 PROCEDURE — 81001 URINALYSIS AUTO W/SCOPE: CPT | Performed by: EMERGENCY MEDICINE

## 2021-03-22 PROCEDURE — 43239 EGD BIOPSY SINGLE/MULTIPLE: CPT | Performed by: INTERNAL MEDICINE

## 2021-03-22 PROCEDURE — 85025 COMPLETE CBC W/AUTO DIFF WBC: CPT | Performed by: EMERGENCY MEDICINE

## 2021-03-22 PROCEDURE — 83690 ASSAY OF LIPASE: CPT | Performed by: EMERGENCY MEDICINE

## 2021-03-22 PROCEDURE — 99284 EMERGENCY DEPT VISIT MOD MDM: CPT

## 2021-03-22 PROCEDURE — 96375 TX/PRO/DX INJ NEW DRUG ADDON: CPT

## 2021-03-22 PROCEDURE — 25010000002 PROPOFOL 10 MG/ML EMULSION: Performed by: ANESTHESIOLOGY

## 2021-03-22 PROCEDURE — 96374 THER/PROPH/DIAG INJ IV PUSH: CPT

## 2021-03-22 PROCEDURE — 81025 URINE PREGNANCY TEST: CPT | Performed by: INTERNAL MEDICINE

## 2021-03-22 PROCEDURE — 80053 COMPREHEN METABOLIC PANEL: CPT | Performed by: EMERGENCY MEDICINE

## 2021-03-22 PROCEDURE — 93010 ELECTROCARDIOGRAM REPORT: CPT | Performed by: INTERNAL MEDICINE

## 2021-03-22 PROCEDURE — 74177 CT ABD & PELVIS W/CONTRAST: CPT

## 2021-03-22 PROCEDURE — 84703 CHORIONIC GONADOTROPIN ASSAY: CPT | Performed by: EMERGENCY MEDICINE

## 2021-03-22 PROCEDURE — 25010000002 MORPHINE PER 10 MG: Performed by: EMERGENCY MEDICINE

## 2021-03-22 PROCEDURE — 25010000002 HYDROMORPHONE PER 4 MG: Performed by: EMERGENCY MEDICINE

## 2021-03-22 PROCEDURE — 88305 TISSUE EXAM BY PATHOLOGIST: CPT | Performed by: INTERNAL MEDICINE

## 2021-03-22 PROCEDURE — 25010000002 ONDANSETRON PER 1 MG: Performed by: EMERGENCY MEDICINE

## 2021-03-22 PROCEDURE — 25010000002 IOPAMIDOL 61 % SOLUTION: Performed by: EMERGENCY MEDICINE

## 2021-03-22 RX ORDER — PROPOFOL 10 MG/ML
VIAL (ML) INTRAVENOUS AS NEEDED
Status: DISCONTINUED | OUTPATIENT
Start: 2021-03-22 | End: 2021-03-22 | Stop reason: SURG

## 2021-03-22 RX ORDER — MORPHINE SULFATE 2 MG/ML
2 INJECTION, SOLUTION INTRAMUSCULAR; INTRAVENOUS ONCE
Status: COMPLETED | OUTPATIENT
Start: 2021-03-22 | End: 2021-03-22

## 2021-03-22 RX ORDER — SODIUM CHLORIDE 9 MG/ML
125 INJECTION, SOLUTION INTRAVENOUS CONTINUOUS
Status: DISCONTINUED | OUTPATIENT
Start: 2021-03-22 | End: 2021-03-23 | Stop reason: HOSPADM

## 2021-03-22 RX ORDER — SODIUM CHLORIDE 0.9 % (FLUSH) 0.9 %
10 SYRINGE (ML) INJECTION AS NEEDED
Status: DISCONTINUED | OUTPATIENT
Start: 2021-03-22 | End: 2021-03-23 | Stop reason: HOSPADM

## 2021-03-22 RX ORDER — LIDOCAINE HYDROCHLORIDE 20 MG/ML
INJECTION, SOLUTION INFILTRATION; PERINEURAL AS NEEDED
Status: DISCONTINUED | OUTPATIENT
Start: 2021-03-22 | End: 2021-03-22 | Stop reason: SURG

## 2021-03-22 RX ORDER — HYDROMORPHONE HYDROCHLORIDE 1 MG/ML
0.5 INJECTION, SOLUTION INTRAMUSCULAR; INTRAVENOUS; SUBCUTANEOUS
Status: DISCONTINUED | OUTPATIENT
Start: 2021-03-22 | End: 2021-03-23 | Stop reason: HOSPADM

## 2021-03-22 RX ORDER — SODIUM CHLORIDE, SODIUM LACTATE, POTASSIUM CHLORIDE, CALCIUM CHLORIDE 600; 310; 30; 20 MG/100ML; MG/100ML; MG/100ML; MG/100ML
1000 INJECTION, SOLUTION INTRAVENOUS CONTINUOUS
Status: DISCONTINUED | OUTPATIENT
Start: 2021-03-22 | End: 2021-03-22 | Stop reason: HOSPADM

## 2021-03-22 RX ORDER — ONDANSETRON 2 MG/ML
4 INJECTION INTRAMUSCULAR; INTRAVENOUS ONCE
Status: COMPLETED | OUTPATIENT
Start: 2021-03-22 | End: 2021-03-22

## 2021-03-22 RX ORDER — LIDOCAINE HYDROCHLORIDE 10 MG/ML
0.5 INJECTION, SOLUTION INFILTRATION; PERINEURAL ONCE AS NEEDED
Status: DISCONTINUED | OUTPATIENT
Start: 2021-03-22 | End: 2021-03-22 | Stop reason: HOSPADM

## 2021-03-22 RX ORDER — SODIUM CHLORIDE 0.9 % (FLUSH) 0.9 %
10 SYRINGE (ML) INJECTION AS NEEDED
Status: DISCONTINUED | OUTPATIENT
Start: 2021-03-22 | End: 2021-03-22 | Stop reason: HOSPADM

## 2021-03-22 RX ADMIN — MORPHINE SULFATE 2 MG: 2 INJECTION, SOLUTION INTRAMUSCULAR; INTRAVENOUS at 23:47

## 2021-03-22 RX ADMIN — SODIUM CHLORIDE, POTASSIUM CHLORIDE, SODIUM LACTATE AND CALCIUM CHLORIDE 1000 ML: 600; 310; 30; 20 INJECTION, SOLUTION INTRAVENOUS at 11:42

## 2021-03-22 RX ADMIN — HYDROMORPHONE HYDROCHLORIDE 0.5 MG: 1 INJECTION, SOLUTION INTRAMUSCULAR; INTRAVENOUS; SUBCUTANEOUS at 20:07

## 2021-03-22 RX ADMIN — PROPOFOL 20 MG: 10 INJECTION, EMULSION INTRAVENOUS at 11:57

## 2021-03-22 RX ADMIN — PROPOFOL 150 MG: 10 INJECTION, EMULSION INTRAVENOUS at 11:55

## 2021-03-22 RX ADMIN — LIDOCAINE HYDROCHLORIDE 60 MG: 20 INJECTION, SOLUTION INFILTRATION; PERINEURAL at 11:54

## 2021-03-22 RX ADMIN — SODIUM CHLORIDE 1000 ML: 9 INJECTION, SOLUTION INTRAVENOUS at 20:04

## 2021-03-22 RX ADMIN — IOPAMIDOL 85 ML: 612 INJECTION, SOLUTION INTRAVENOUS at 20:35

## 2021-03-22 RX ADMIN — ONDANSETRON 4 MG: 2 INJECTION INTRAMUSCULAR; INTRAVENOUS at 20:06

## 2021-03-22 NOTE — ANESTHESIA PREPROCEDURE EVALUATION
Anesthesia Evaluation     Patient summary reviewed   NPO Solid Status: > 8 hours  NPO Liquid Status: > 2 hours           Airway   Mallampati: I  TM distance: >3 FB  Neck ROM: full  Dental      Pulmonary     breath sounds clear to auscultation  (-) shortness of breath  Cardiovascular   Exercise tolerance: good (4-7 METS)    Rhythm: regular  Rate: normal    (+) hyperlipidemia,   (-) angina, PALOMARES      Neuro/Psych  GI/Hepatic/Renal/Endo    (+)  PUD,      Musculoskeletal     Abdominal    Substance History      OB/GYN          Other                      Anesthesia Plan    ASA 2     MAC   (MAC anesthesia discussed with patient and/or patient representative. Risks (including but not limited to intra-op awareness), benefits, and alternatives were discussed. Understanding was voiced with an agreement to proceed with a MAC technique and General as a backup option.   )  intravenous induction     Anesthetic plan, all risks, benefits, and alternatives have been provided, discussed and informed consent has been obtained with: patient.

## 2021-03-22 NOTE — ED NOTES
PT presents to ED with complaints of abd pain. Pt denies N/V/D at this time. Pt is A&OX4, able to ambulate, and in a mask at this time. Pt is primary Moh[place] speaking, and there are notes from a procedure that was done with GI doctors this morning.             Jose Swanson, RN  03/22/21 6036

## 2021-03-22 NOTE — ED PROVIDER NOTES
" EMERGENCY DEPARTMENT ENCOUNTER    Room Number:  22/22  Date of encounter:  3/23/2021  PCP: Cecilio Fairchild MD  Historian: Patient and       HPI:  Chief Complaint: Midepigastric abdominal pain  A complete HPI/ROS/PMH/PSH/SH/FH are unobtainable due to:  was used to obtain a very good accurate history.  Context: Jeanie Cortez is a 46 y.o. female who presents to the ED c/o complaining of midepigastric pain that started approximately an hour hour and a half after an EGD this morning.  The EGD was performed because of midepigastric pain and please see medical history reviewed below.  After the procedure patient was pain-free up till about an hour hour and a half afterward.  Pain is severe in the midepigastric region.  Patient had some radiation to the lower chest but that actually is better at this point.  Has had nausea but has not had any vomiting.  Prior to this episode she had the EGD because of midepigastric pain.  The midepigastric pain has been going on for months.  Tried Protonix with some partial relief.  Also has tried Pepcid.  The pain is constant but is usually worse with eating.  Over the past months it is more there than and not there.  She describes it as \"hard pain, burning at times.  Associated nausea.  She has had no fevers or chills.  No shortness of breath.  She has never had any surgery on her abdomen.  The only medicine that she is taking right now is Pepcid and Zofran.  She denies any weakness to arms or legs.  She not allergic to any medicines.  Denies any other medical problems.        Previous Episodes: See above  Current Symptoms: See above    MEDICAL HISTORY REVIEWED  Patient had an EGD today at 11:45 AM.  It was done by Dr. Nicko larkin, GI doctor, for epigastric abdominal pain and previous treatment for H. pylori.  Patient's exam was normal I reviewed the procedure note.  Biopsies were taken.      PAST MEDICAL HISTORY  Active Ambulatory Problems     " Diagnosis Date Noted   • Epigastric pain 03/08/2021   • Nausea 03/08/2021   • History of Helicobacter pylori infection 03/08/2021     Resolved Ambulatory Problems     Diagnosis Date Noted   • No Resolved Ambulatory Problems     Past Medical History:   Diagnosis Date   • H pylori ulcer    • Hyperlipidemia          PAST SURGICAL HISTORY  No past surgical history on file.      FAMILY HISTORY  Family History   Problem Relation Age of Onset   • Throat cancer Father    • Lung cancer Sister          SOCIAL HISTORY  Social History     Socioeconomic History   • Marital status: Single     Spouse name: Not on file   • Number of children: Not on file   • Years of education: Not on file   • Highest education level: Not on file   Tobacco Use   • Smoking status: Never Smoker   • Smokeless tobacco: Never Used   Vaping Use   • Vaping Use: Never used   Substance and Sexual Activity   • Alcohol use: Yes     Comment: 1 glass per month   • Drug use: Never         ALLERGIES  Patient has no known allergies.        REVIEW OF SYSTEMS  Review of Systems     All systems reviewed and negative except for those discussed in HPI.       PHYSICAL EXAM    I have reviewed the triage vital signs and nursing notes.    ED Triage Vitals   Temp Heart Rate Resp BP SpO2   03/22/21 1815 03/22/21 1815 03/22/21 1815 03/22/21 1817 03/22/21 1815   97.9 °F (36.6 °C) 81 15 137/91 99 %      Temp src Heart Rate Source Patient Position BP Location FiO2 (%)   03/22/21 1815 03/22/21 1815 03/22/21 1817 03/22/21 1817 --   Tympanic Monitor Standing Left arm        GENERAL: Female no acute distress.Vital signs on my initial evaluation O2 sat is 100% on room air.  Blood pressure and heart rate are normal.  HENT: nares patent  Head/neck/ face are symmetric without gross deformity, signs of trauma, or swelling  EYES: no scleral icterus, no conjunctival pallor.  NECK: Supple, no meningismus  CV: regular rhythm, regular rate with intact distal pulses.  No murmur or  rub  RESPIRATORY: normal effort and no respiratory distress.  To auscultation bilaterally  ABDOMEN: soft and under this is located in the midepigastric region and a little radiation to the left upper quadrant.  She has good bowel sounds.  There is no guarding or rebound.  Does not have signs of peritonitis.  MUSCULOSKELETAL: no deformity.  No edema.  Good strong intact distal pulses to upper and lower extremities that are equal strong and symmetric bilaterally.  NEURO: alert and appropriate, moves all extremities, follows commands.  Alert and oriented x3.  No focal motor or sensory changes.  SKIN: warm, dry    Vital signs and nursing notes reviewed.        LAB RESULTS  Recent Results (from the past 24 hour(s))   POC Pregnancy, Urine    Collection Time: 03/22/21 11:49 AM    Specimen: Urine   Result Value Ref Range    HCG, Urine, QL Negative Negative    Lot Number [id number]     Internal Positive Control Positive     Internal Negative Control Negative    Urinalysis With Microscopic If Indicated (No Culture) - Urine, Clean Catch    Collection Time: 03/22/21  7:11 PM    Specimen: Urine, Clean Catch   Result Value Ref Range    Color, UA Yellow Yellow, Straw    Appearance, UA Clear Clear    pH, UA 8.0 5.0 - 8.0    Specific Gravity, UA 1.016 1.005 - 1.030    Glucose, UA Negative Negative    Ketones, UA Negative Negative    Bilirubin, UA Negative Negative    Blood, UA Large (3+) (A) Negative    Protein, UA Negative Negative    Leuk Esterase, UA Moderate (2+) (A) Negative    Nitrite, UA Negative Negative    Urobilinogen, UA 0.2 E.U./dL 0.2 - 1.0 E.U./dL   Urinalysis, Microscopic Only - Urine, Clean Catch    Collection Time: 03/22/21  7:11 PM    Specimen: Urine, Clean Catch   Result Value Ref Range    RBC, UA 3-5 (A) None Seen, 0-2 /HPF    WBC, UA 6-12 (A) None Seen, 0-2 /HPF    Bacteria, UA 3+ (A) None Seen /HPF    Squamous Epithelial Cells, UA 0-2 None Seen, 0-2 /HPF    Hyaline Casts, UA 3-6 None Seen /LPF    Methodology  Manual Light Microscopy    Comprehensive Metabolic Panel    Collection Time: 03/22/21  7:52 PM    Specimen: Blood   Result Value Ref Range    Glucose 92 65 - 99 mg/dL    BUN 8 6 - 20 mg/dL    Creatinine 0.67 0.57 - 1.00 mg/dL    Sodium 139 136 - 145 mmol/L    Potassium 4.4 3.5 - 5.2 mmol/L    Chloride 106 98 - 107 mmol/L    CO2 25.6 22.0 - 29.0 mmol/L    Calcium 8.9 8.6 - 10.5 mg/dL    Total Protein 6.8 6.0 - 8.5 g/dL    Albumin 4.20 3.50 - 5.20 g/dL    ALT (SGPT) 16 1 - 33 U/L    AST (SGOT) 18 1 - 32 U/L    Alkaline Phosphatase 69 39 - 117 U/L    Total Bilirubin <0.2 0.0 - 1.2 mg/dL    eGFR Non African Amer 94 >60 mL/min/1.73    Globulin 2.6 gm/dL    A/G Ratio 1.6 g/dL    BUN/Creatinine Ratio 11.9 7.0 - 25.0    Anion Gap 7.4 5.0 - 15.0 mmol/L   Lipase    Collection Time: 03/22/21  7:52 PM    Specimen: Blood   Result Value Ref Range    Lipase 42 13 - 60 U/L   hCG, Serum, Qualitative    Collection Time: 03/22/21  7:52 PM    Specimen: Blood   Result Value Ref Range    HCG Qualitative Negative Negative   Light Blue Top    Collection Time: 03/22/21  7:52 PM   Result Value Ref Range    Extra Tube hold for add-on    Green Top (Gel)    Collection Time: 03/22/21  7:52 PM   Result Value Ref Range    Extra Tube Hold for add-ons.    Lavender Top    Collection Time: 03/22/21  7:52 PM   Result Value Ref Range    Extra Tube hold for add-on    CBC Auto Differential    Collection Time: 03/22/21  7:52 PM    Specimen: Blood   Result Value Ref Range    WBC 7.92 3.40 - 10.80 10*3/mm3    RBC 4.53 3.77 - 5.28 10*6/mm3    Hemoglobin 13.2 12.0 - 15.9 g/dL    Hematocrit 38.5 34.0 - 46.6 %    MCV 85.0 79.0 - 97.0 fL    MCH 29.1 26.6 - 33.0 pg    MCHC 34.3 31.5 - 35.7 g/dL    RDW 12.7 12.3 - 15.4 %    RDW-SD 38.8 37.0 - 54.0 fl    MPV 10.2 6.0 - 12.0 fL    Platelets 304 140 - 450 10*3/mm3    Neutrophil % 64.6 42.7 - 76.0 %    Lymphocyte % 26.5 19.6 - 45.3 %    Monocyte % 6.9 5.0 - 12.0 %    Eosinophil % 1.1 0.3 - 6.2 %    Basophil % 0.6 0.0 -  1.5 %    Immature Grans % 0.3 0.0 - 0.5 %    Neutrophils, Absolute 5.11 1.70 - 7.00 10*3/mm3    Lymphocytes, Absolute 2.10 0.70 - 3.10 10*3/mm3    Monocytes, Absolute 0.55 0.10 - 0.90 10*3/mm3    Eosinophils, Absolute 0.09 0.00 - 0.40 10*3/mm3    Basophils, Absolute 0.05 0.00 - 0.20 10*3/mm3    Immature Grans, Absolute 0.02 0.00 - 0.05 10*3/mm3    nRBC 0.0 0.0 - 0.2 /100 WBC   Troponin    Collection Time: 03/22/21  7:52 PM    Specimen: Blood   Result Value Ref Range    Troponin T <0.010 0.000 - 0.030 ng/mL   ECG 12 Lead    Collection Time: 03/22/21  8:13 PM   Result Value Ref Range    QT Interval 373 ms       Ordered the above labs and independently reviewed the results.        RADIOLOGY  CT Chest With Contrast Diagnostic    Result Date: 3/22/2021  CT CHEST W CONTRAST DIAGNOSTIC-, CT ABDOMEN PELVIS W CONTRAST-  INDICATIONS: Pain after endoscopy.  Radiation dose reduction techniques were utilized, including automated exposure control and exposure modulation based on body size.  TECHNIQUE: Enhanced CT of the chest, abdomen, pelvis  COMPARISON: Abdomen and pelvis CT from 03/05/2015  FINDINGS:  Chest CT:  The heart size is normal without pericardial effusion. A few small subcentimeter short axis mediastinal lymph nodes are seen that are not significant by size criteria. Thyroid nodularity is apparent, only partly included and suboptimally evaluated with this technique, thyroid ultrasound can be obtained for further evaluation as indicated  The airways appear clear.  No pleural effusion or pneumothorax.  The lungs show no focal pulmonary consolidation or mass.      Abdomen pelvis CT:  Unremarkable appearance of the liver, gallbladder, spleen, adrenal glands, pancreas, kidneys, bladder. Multiple likely fibroids in the uterus, largest measuring 5.8 cm, not apparent on the prior unenhanced exam, follow-up pelvic ultrasound recommended to characterize change/exclude any possibility of leiomyosarcoma.  No bowel obstruction or  abnormal bowel thickening is identified. The appendix does not appear inflamed. Abundant material in the stomach may be result of recent ingestion or could be evidence of delayed or obstructed gastric emptying.  No free intraperitoneal gas or free fluid.  Scattered small mesenteric and para-aortic lymph nodes are seen that are not significant by size criteria.  Abdominal aorta is not aneurysmal.  Degenerative changes are seen in the spine. No acute fracture is identified.          1. No esophageal rupture or leak was identified. 2. Multiple likely fibroids in the uterus, as described, follow-up advised.  This report was finalized on 3/22/2021 8:57 PM by Dr. Mark Marquez M.D.      CT Abdomen Pelvis With Contrast    Result Date: 3/22/2021  CT CHEST W CONTRAST DIAGNOSTIC-, CT ABDOMEN PELVIS W CONTRAST-  INDICATIONS: Pain after endoscopy.  Radiation dose reduction techniques were utilized, including automated exposure control and exposure modulation based on body size.  TECHNIQUE: Enhanced CT of the chest, abdomen, pelvis  COMPARISON: Abdomen and pelvis CT from 03/05/2015  FINDINGS:  Chest CT:  The heart size is normal without pericardial effusion. A few small subcentimeter short axis mediastinal lymph nodes are seen that are not significant by size criteria. Thyroid nodularity is apparent, only partly included and suboptimally evaluated with this technique, thyroid ultrasound can be obtained for further evaluation as indicated  The airways appear clear.  No pleural effusion or pneumothorax.  The lungs show no focal pulmonary consolidation or mass.      Abdomen pelvis CT:  Unremarkable appearance of the liver, gallbladder, spleen, adrenal glands, pancreas, kidneys, bladder. Multiple likely fibroids in the uterus, largest measuring 5.8 cm, not apparent on the prior unenhanced exam, follow-up pelvic ultrasound recommended to characterize change/exclude any possibility of leiomyosarcoma.  No bowel obstruction or  abnormal bowel thickening is identified. The appendix does not appear inflamed. Abundant material in the stomach may be result of recent ingestion or could be evidence of delayed or obstructed gastric emptying.  No free intraperitoneal gas or free fluid.  Scattered small mesenteric and para-aortic lymph nodes are seen that are not significant by size criteria.  Abdominal aorta is not aneurysmal.  Degenerative changes are seen in the spine. No acute fracture is identified.          1. No esophageal rupture or leak was identified. 2. Multiple likely fibroids in the uterus, as described, follow-up advised.  This report was finalized on 3/22/2021 8:57 PM by Dr. Mark Marquez M.D.        I ordered the above noted radiological studies. Reviewed by me and discussed with radiologist.  See dictation for official radiology interpretation.      PROCEDURES    Procedures      MEDICATIONS GIVEN IN ER    Medications   sodium chloride 0.9 % flush 10 mL (has no administration in time range)   sodium chloride 0.9 % infusion (has no administration in time range)   HYDROmorphone (DILAUDID) injection 0.5 mg (0.5 mg Intravenous Given 3/22/21 2007)   sodium chloride 0.9 % bolus 1,000 mL (0 mL Intravenous Stopped 3/22/21 2034)   ondansetron (ZOFRAN) injection 4 mg (4 mg Intravenous Given 3/22/21 2006)   iopamidol (ISOVUE-300) 61 % injection 100 mL (85 mL Intravenous Given by Other 3/22/21 2035)   morphine injection 2 mg (2 mg Intravenous Given 3/22/21 2347)         PROGRESS, DATA ANALYSIS, CONSULTS, AND MEDICAL DECISION MAKING    Postop EGD pain.  Had pain prior to the EGD in the midepigastric region.  She is hemodynamically stable.  She is requesting pain medicine.  I Estefani give her some Dilaudid and Zofran and she reports no history of any allergies.  We will check lab work as well as a CT scan.  All questions answered at this time through the .  We are currently under a pandemic from the COVID19 infection.  The  patient presented to the emergency department by ambulance or personal vehicle. I followed the current protocols required by Infection Control at [facility] in my evaluation and treatment of the patient. The patient was wearing a face mask during my evaluation and throughout my encounter. During my whole encounter with this patient I used appropriate personal protective equipment.  This equipment consisted of eye protection, facemask, gown, and gloves.  I applied this equipment before entering the room.      All labs have been independently reviewed by me.  All radiology studies have been reviewed by me and discussed with radiologist dictating the report.   EKG's independently viewed and interpreted by me.  Discussion below represents my analysis of pertinent findings related to patient's condition, differential diagnosis, treatment plan and final disposition.      ED Course as of Mar 23 0124   Mon Mar 22, 2021   2128 EKG readingEKG was done at 2013It is a rate of 87 and is normal sinus rhythmNarrow complexNormal axisSee some nonspecific T wave changes in 3 and V2.  That are very nonspecific I do not see any signs of any acute injury patternQT is normalNo old EKG to compare with.    [MM]   2230 CT scan of the chest, and abdomen pelvis report was reviewed.  No signs of any rupture or injury.  CT scan is unremarkable other than some chronic fibroids.  Please see complete dictated report from the radiologist.    [MM]   2309 Reevaluated the patient and reexamined the patient.  Patient's abdominal exam is benign.  Her pain is much improved and is almost 100% resolved.  I communicated with her with , Mandie, who is a tech here in the emergency department.  All questions were answered.  Informed the patient of the results of the CT scan and lab work.  I also gave her warning signs to return if she develops worsening of pain, fever, any bleeding, or any concerns.    [MM]   2322 I called and  spoke with Dr. Holden and informed him of the results of the test.  He was upset that I called him at this late hour.  He asked me if the patient need to be admitted and I said no she is doing better and the ct scans look okay.  He then states why are you calling me and I am going to bed and did not want to talk to me anymore.  I was able unable to provide any other information as he hung up.    [MM]      ED Course User Index  [MM] Xavier Durán MD       AS OF 01:24 EDT VITALS:    BP - 119/76  HR - 72  TEMP - 97.9 °F (36.6 °C) (Tympanic)  02 SATS - 98%        DIAGNOSIS  Final diagnoses:   Abdominal pain, epigastric   History of esophagogastroduodenoscopy (EGD): Today with increased pain after procedure         DISPOSITION  DISCHARGE    Patient discharged in stable condition.    Reviewed implications of results, diagnosis, meds, responsibility to follow up, warning signs and symptoms of possible worsening, potential complications and reasons to return to ER, including worsening of symptoms, fever, shortness of breath, any bleeding, or any concerns..    Patient/Family voiced understanding of above instructions.    Discussed plan for discharge, as there is no emergent indication for admission. Pt/family is agreeable and understands need for follow up and repeat testing.  Pt is aware that discharge does not mean that nothing is wrong but it indicates no emergency is present that requires admission and they must continue care with follow-up as given below or physician of their choice.     FOLLOW-UP  Cecilio Fairchild MD  [address]  [address]  [facility] [address]  [phone]    In 3 days  Return if pain worsens, If symptoms worsen, shortness of breath, fever, any concerns    Joseph Holden MD  [address]  [address]  [facility] [id number] [phone]      Call tomorrow morning to inform them of your symptoms and how you are doing.  Return if worsening of pain, any bleeding, fever, shortness of breath,  any concerns.         Medication List      No changes were made to your prescriptions during this visit.                  Xavier Durán MD  03/23/21 0124

## 2021-03-22 NOTE — ED NOTES
Pt arrives from home with c/o severe upper abd pain x months and had endoscopy done earlier today for this reason. Pt reports taking pain medication earlier today but it didn't help so she came to ER for further eval. Pt a&ox4, abc's intact, NAD noted, ambulatory to room.     used for assessment. ID#[id number], Cecilio.     Pt noted to have mask on when this RN entered the room. This RN wore appropriate PPE throughout our encounter. Hand hygiene performed upon entering and exiting room.       Rita Lyle, RN  03/22/21 1939

## 2021-03-22 NOTE — ANESTHESIA POSTPROCEDURE EVALUATION
"Patient: Jeanie Cortez    Procedure Summary     Date: 03/22/21 Room / Location:  [facility] /  [facility]    Anesthesia Start: 1150 Anesthesia Stop: 1206    Procedure: ESOPHAGOGASTRODUODENOSCOPY WITH COLD BX'S (N/A Esophagus) Diagnosis:       Epigastric pain      Nausea      History of Helicobacter pylori infection      (Epigastric pain [R10.13])      (Nausea [R11.0])      (History of Helicobacter pylori infection [Z86.19])    Surgeons: Joseph Holden MD Provider: Joseph Caruso MD    Anesthesia Type: MAC ASA Status: 2          Anesthesia Type: MAC    Vitals  Vitals Value Taken Time   /72 03/22/21 1230   Temp     Pulse 71 03/22/21 1230   Resp 16 03/22/21 1230   SpO2 100 % 03/22/21 1230           Post Anesthesia Care and Evaluation    Patient location during evaluation: bedside  Patient participation: complete - patient participated  Level of consciousness: awake and alert  Pain management: adequate  Airway patency: patent  Anesthetic complications: No anesthetic complications    Cardiovascular status: acceptable  Respiratory status: acceptable  Hydration status: acceptable    Comments: /72 (BP Location: Left arm, Patient Position: Sitting)   Pulse 71   Resp 16   Ht 152.4 cm (60\")   Wt 64.4 kg (142 lb)   LMP 03/21/2021 (Exact Date)   SpO2 100%   BMI 27.73 kg/m²       "

## 2021-03-22 NOTE — DISCHARGE INSTRUCTIONS
For the next 24 hours patient needs to be with a responsible adult.    For 24 hours DO NOT drive, operate machinery, appliances, drink alcohol, make important decisions or sign legal documents.    Start with a light or bland diet if you are feeling sick to your stomach otherwise advance to regular diet as tolerated.    Follow recommendations on procedure report if provided by your doctor.    Call Dr Holden for problems [phone]    Problems may include but not limited to: large amounts of bleeding, trouble breathing, repeated vomiting, severe unrelieved pain, fever or chills.

## 2021-03-23 ENCOUNTER — TELEPHONE (OUTPATIENT)
Dept: GASTROENTEROLOGY | Facility: CLINIC | Age: 46
End: 2021-03-23

## 2021-03-23 VITALS
OXYGEN SATURATION: 98 % | HEART RATE: 72 BPM | TEMPERATURE: 97.9 F | SYSTOLIC BLOOD PRESSURE: 119 MMHG | RESPIRATION RATE: 15 BRPM | DIASTOLIC BLOOD PRESSURE: 76 MMHG

## 2021-03-23 DIAGNOSIS — R10.13 EPIGASTRIC PAIN: Primary | ICD-10-CM

## 2021-03-23 DIAGNOSIS — R11.0 NAUSEA: ICD-10-CM

## 2021-03-23 LAB
LAB AP CASE REPORT: NORMAL
PATH REPORT.FINAL DX SPEC: NORMAL
PATH REPORT.GROSS SPEC: NORMAL

## 2021-03-23 NOTE — TELEPHONE ENCOUNTER
----- Message from Gamaliel Lr sent at 3/22/2021  4:11 PM EDT -----  Regarding: stomach pain/buring  Contact: [phone]  Pt is experiencing intense burning and pain when she eats. She has an egd/biopsy of stomach this morning, 3/22/21. Please call to advise. Thank you

## 2021-03-23 NOTE — TELEPHONE ENCOUNTER
Pantoprazole 40 mg one tablet by mouth daily #30 with 5 refills, called into patient's pharmacy.   Update to Mally via IM.

## 2021-03-23 NOTE — TELEPHONE ENCOUNTER
----- Message from Gamaliel Lr sent at 3/22/2021  4:11 PM EDT -----  Regarding: stomach pain/buring  Contact: [phone]  Pt is experiencing intense burning and pain when she eats. She has an egd/biopsy of stomach this morning, 3/22/21. Please call to advise. Thank you

## 2021-03-23 NOTE — TELEPHONE ENCOUNTER
----- Message from Gamaliel Perry sent at 3/23/2021 11:42 AM EDT -----  Regarding: pantoprazole (PROTONIX) 40 MG EC tablet  Contact: [phone]  PT was prescribed Protonix on 03/08/21 & 03/22/21. PT pharmacy states they are not receiving prescription. Please clarify and send PT refill       pantoprazole (PROTONIX) 40 MG EC tablet      [id number] - [place], [place] - [address] AT SEC [address] [facility] - [phone]  - [phone] FX   [address], [facility] [address]   Phone:  [phone]  Fax:  [phone]

## 2021-03-23 NOTE — TELEPHONE ENCOUNTER
Patient was seen in the ER on 3/22/21 and not admited.   Patient called, no answer. Left VM advising is she had continued issues to call back.

## 2021-03-29 ENCOUNTER — TELEPHONE (OUTPATIENT)
Dept: GASTROENTEROLOGY | Facility: CLINIC | Age: 46
End: 2021-03-29

## 2021-03-29 NOTE — TELEPHONE ENCOUNTER
Call placed to [facility]'s medical record department. Request for last office note, procedure and path reports. Was advised the medical records will be faxed.

## 2021-03-29 NOTE — TELEPHONE ENCOUNTER
Returned phone call using [employer].   No answer. Did not leave message. Will await return phone call from patient.

## 2021-03-29 NOTE — TELEPHONE ENCOUNTER
----- Message from Gamaliel Perry sent at 3/29/2021 10:10 AM EDT -----  Regarding: Dicyclomine  Contact: [phone]  PT would like to have prescription for Dicyclomine filled due to still being in pain and ON call Physician advised to stop taking protonix over the weekend, please advise           [facility] [id number] - [place], [place] - [address] AT SEC OF [facility] - [phone]  - [phone] FX   [address], [facility] [address]   Phone:  [phone]  Fax:  [phone]

## 2021-03-29 NOTE — TELEPHONE ENCOUNTER
Returned patient's phone call using [employer]. Called [phone] no answer. Did not leave message.   Called [phone] no answer. Did not leave message. Await return phone call.

## 2021-04-02 RX ORDER — DICYCLOMINE HYDROCHLORIDE 10 MG/1
10 CAPSULE ORAL
Qty: 120 CAPSULE | Refills: 0 | Status: SHIPPED | OUTPATIENT
Start: 2021-04-02 | End: 2021-04-29 | Stop reason: SDUPTHER

## 2021-04-05 ENCOUNTER — TELEPHONE (OUTPATIENT)
Dept: GASTROENTEROLOGY | Facility: CLINIC | Age: 46
End: 2021-04-05

## 2021-04-05 NOTE — TELEPHONE ENCOUNTER
Called pt using [employer]  #[phone].  Pt states she is having difficulty getting HIDA and gall bladder US scheduled. Advised pt I would contact scheduling and have someone contact her.  Called scheduling, they will call and schedule procedures for pt.  Also, confirmed pt's f/u appt. Pt verbalized understanding.

## 2021-04-05 NOTE — TELEPHONE ENCOUNTER
----- Message from Gamaliel Lr sent at 4/5/2021 11:47 AM EDT -----  Regarding: pt question  Contact: [phone]  Please call to advise questions. Thank you

## 2021-04-29 ENCOUNTER — OFFICE VISIT (OUTPATIENT)
Dept: GASTROENTEROLOGY | Facility: CLINIC | Age: 46
End: 2021-04-29

## 2021-04-29 VITALS — BODY MASS INDEX: 27.8 KG/M2 | WEIGHT: 141.6 LBS | HEIGHT: 60 IN | TEMPERATURE: 97.3 F

## 2021-04-29 DIAGNOSIS — R10.10 PAIN OF UPPER ABDOMEN: Primary | ICD-10-CM

## 2021-04-29 PROCEDURE — 99213 OFFICE O/P EST LOW 20 MIN: CPT | Performed by: NURSE PRACTITIONER

## 2021-04-29 RX ORDER — DICYCLOMINE HYDROCHLORIDE 10 MG/1
10 CAPSULE ORAL
Qty: 120 CAPSULE | Refills: 3 | Status: SHIPPED | OUTPATIENT
Start: 2021-04-29 | End: 2022-09-16 | Stop reason: ALTCHOICE

## 2021-04-29 NOTE — PROGRESS NOTES
Chief Complaint   Patient presents with   • Abdominal Pain     HPI    Jeanie Macdonald is a  46 y.o. female here for a follow up visit for abdominal pain.  This patient follows myself and Dr. Holden.  She has a past medical history of H. pylori ulcer and hyperlipidemia.    Normal colonoscopy 1 year ago with [facility].    She underwent EGD on 3/22/2021 which I reviewed demonstrating normal findings.  Pathology was benign and gallbladder work-up was recommended.    On visit today she reports feeling quite well.  She is taking Bentyl as needed for abdominal cramps but her abdominal pain has resolved.  No nausea or vomiting.  Appetite is good.  No dysphagia or odynophagia.  She stopped taking PPI therapy and feels fine.    No diarrhea, constipation, or rectal bleeding.      Past Medical History:   Diagnosis Date   • H pylori ulcer    • Hyperlipidemia        Past Surgical History:   Procedure Laterality Date   • ENDOSCOPY N/A 3/22/2021    Procedure: ESOPHAGOGASTRODUODENOSCOPY WITH COLD BX'S;  Surgeon: Joseph Holden MD;  Location: [facility] ENDOSCOPY;  Service: Gastroenterology;  Laterality: N/A;  pre: EPIGASTRIC PAIN, HX H. PYLORI  post: NORMAL       Scheduled Meds:  Outpatient Encounter Medications as of 4/29/2021   Medication Sig Dispense Refill   • dicyclomine (Bentyl) 10 MG capsule Take 1 capsule by mouth 4 (Four) Times a Day Before Meals & at Bedtime. 120 capsule 3   • [DISCONTINUED] dicyclomine (Bentyl) 10 MG capsule Take 1 capsule by mouth 4 (Four) Times a Day Before Meals & at Bedtime. 120 capsule 0   • [DISCONTINUED] ondansetron ODT (Zofran ODT) 4 MG disintegrating tablet Place 1 tablet on the tongue Every 8 (Eight) Hours As Needed for Nausea or Vomiting. 25 tablet 1   • [DISCONTINUED] pantoprazole (PROTONIX) 40 MG EC tablet Take 1 tablet by mouth 2 (Two) Times a Day. 60 tablet 5     No facility-administered encounter medications on file as of 4/29/2021.       Continuous  Infusions:No current facility-administered medications for this visit.      PRN Meds:.    Allergies   Allergen Reactions   • Famotidine GI Intolerance   • Pantoprazole GI Intolerance       Social History     Socioeconomic History   • Marital status: Single     Spouse name: Not on file   • Number of children: Not on file   • Years of education: Not on file   • Highest education level: Not on file   Tobacco Use   • Smoking status: Never Smoker   • Smokeless tobacco: Never Used   Vaping Use   • Vaping Use: Never used   Substance and Sexual Activity   • Alcohol use: Yes     Comment: 1 glass per month   • Drug use: Never       Family History   Problem Relation Age of Onset   • Throat cancer Father    • Lung cancer Sister        Review of Systems   Constitutional: Negative for activity change, appetite change, fatigue, fever and unexpected weight change.   HENT: Negative for trouble swallowing.    Respiratory: Negative for apnea, cough, choking, chest tightness, shortness of breath and wheezing.    Cardiovascular: Negative for chest pain, palpitations and leg swelling.   Gastrointestinal: Negative for abdominal distention, abdominal pain, anal bleeding, blood in stool, constipation, diarrhea, nausea, rectal pain and vomiting.       Vitals:    04/29/21 0917   Temp: 97.3 °F (36.3 °C)       Physical Exam  Constitutional:       Appearance: She is well-developed.   Cardiovascular:      Rate and Rhythm: Normal rate and regular rhythm.      Heart sounds: Normal heart sounds.   Pulmonary:      Effort: Pulmonary effort is normal. No respiratory distress.      Breath sounds: Normal breath sounds. No wheezing.   Abdominal:      General: Bowel sounds are normal. There is no distension.      Palpations: Abdomen is soft. There is no mass.      Tenderness: There is no abdominal tenderness. There is no guarding.      Hernia: No hernia is present.   Neurological:      Mental Status: She is alert and oriented to person, place, and time.    Psychiatric:         Behavior: Behavior normal.     Assessment    Abdominal pain, resolved  History of H. Pylori    Plan    Pleasant 46-year-old female seen today with the help of the  service to follow-up on recent endoscopic evaluation prompted for continued abdominal pain and history of H. pylori.  I reviewed procedural findings with the patient which were normal as well as pathology that was benign.  There was no evidence of H. pylori.  She is feeling well off of PPI therapy.  The only thing she still taking is as needed dicyclomine for abdominal cramps.  We discussed the possibility of a gallbladder work-up but given her improvement in symptoms she would like to hold off.  I told the patient via  and her son that if her symptoms return she needs to call us and we will move forward with a HIDA scan and a gallbladder ultrasound.  At this point she can follow-up with our services as needed.

## 2022-09-16 ENCOUNTER — OFFICE VISIT (OUTPATIENT)
Dept: SURGERY | Facility: CLINIC | Age: 47
End: 2022-09-16

## 2022-09-16 VITALS — WEIGHT: 150 LBS | HEIGHT: 60 IN | BODY MASS INDEX: 29.45 KG/M2

## 2022-09-16 DIAGNOSIS — R10.13 ABDOMINAL PAIN, EPIGASTRIC: Primary | ICD-10-CM

## 2022-09-16 DIAGNOSIS — D21.9 MYOMA: ICD-10-CM

## 2022-09-16 PROCEDURE — 99203 OFFICE O/P NEW LOW 30 MIN: CPT | Performed by: SURGERY

## 2022-09-16 RX ORDER — PANTOPRAZOLE SODIUM 40 MG/1
40 TABLET, DELAYED RELEASE ORAL DAILY
COMMUNITY
End: 2022-09-16

## 2022-09-16 RX ORDER — OMEPRAZOLE 40 MG/1
40 CAPSULE, DELAYED RELEASE ORAL EVERY EVENING
COMMUNITY

## 2022-09-16 RX ORDER — CHLORCYCLIZINE HYDROCHLORIDE AND PSEUDOEPHEDRINE HYDROCHLORIDE 25; 60 MG/1; MG/1
1 TABLET ORAL EVERY 8 HOURS PRN
COMMUNITY
Start: 2022-09-08

## 2022-09-16 RX ORDER — FAMOTIDINE 20 MG/1
20 TABLET, FILM COATED ORAL 2 TIMES DAILY
COMMUNITY
End: 2022-09-16 | Stop reason: ALTCHOICE

## 2022-09-16 RX ORDER — LEVOCETIRIZINE DIHYDROCHLORIDE 5 MG/1
5 TABLET, FILM COATED ORAL EVERY EVENING
COMMUNITY
Start: 2022-09-07 | End: 2023-02-21

## 2022-09-16 RX ORDER — AMOXICILLIN 250 MG
1 CAPSULE ORAL DAILY
Qty: 60 TABLET | Refills: 2 | Status: SHIPPED | OUTPATIENT
Start: 2022-09-16 | End: 2023-02-21

## 2022-09-19 ENCOUNTER — LAB (OUTPATIENT)
Dept: LAB | Facility: HOSPITAL | Age: 47
End: 2022-09-19

## 2022-09-19 DIAGNOSIS — R10.13 ABDOMINAL PAIN, EPIGASTRIC: ICD-10-CM

## 2022-09-19 LAB
ALBUMIN SERPL-MCNC: 4.5 G/DL (ref 3.5–5.2)
ALBUMIN/GLOB SERPL: 1.8 G/DL
ALP SERPL-CCNC: 81 U/L (ref 39–117)
ALT SERPL W P-5'-P-CCNC: 27 U/L (ref 1–33)
AMYLASE SERPL-CCNC: 50 U/L (ref 28–100)
ANION GAP SERPL CALCULATED.3IONS-SCNC: 8.6 MMOL/L (ref 5–15)
AST SERPL-CCNC: 23 U/L (ref 1–32)
BASOPHILS # BLD AUTO: 0.05 10*3/MM3 (ref 0–0.2)
BASOPHILS NFR BLD AUTO: 0.8 % (ref 0–1.5)
BILIRUB SERPL-MCNC: 0.3 MG/DL (ref 0–1.2)
BUN SERPL-MCNC: 11 MG/DL (ref 6–20)
BUN/CREAT SERPL: 15.9 (ref 7–25)
CALCIUM SPEC-SCNC: 8.9 MG/DL (ref 8.6–10.5)
CHLORIDE SERPL-SCNC: 104 MMOL/L (ref 98–107)
CO2 SERPL-SCNC: 25.4 MMOL/L (ref 22–29)
CREAT SERPL-MCNC: 0.69 MG/DL (ref 0.57–1)
DEPRECATED RDW RBC AUTO: 38.9 FL (ref 37–54)
EGFRCR SERPLBLD CKD-EPI 2021: 107.2 ML/MIN/1.73
EOSINOPHIL # BLD AUTO: 0.09 10*3/MM3 (ref 0–0.4)
EOSINOPHIL NFR BLD AUTO: 1.4 % (ref 0.3–6.2)
ERYTHROCYTE [DISTWIDTH] IN BLOOD BY AUTOMATED COUNT: 12.8 % (ref 12.3–15.4)
GLOBULIN UR ELPH-MCNC: 2.5 GM/DL
GLUCOSE SERPL-MCNC: 96 MG/DL (ref 65–99)
HCT VFR BLD AUTO: 37.7 % (ref 34–46.6)
HGB BLD-MCNC: 12.9 G/DL (ref 12–15.9)
IMM GRANULOCYTES # BLD AUTO: 0.01 10*3/MM3 (ref 0–0.05)
IMM GRANULOCYTES NFR BLD AUTO: 0.2 % (ref 0–0.5)
LIPASE SERPL-CCNC: 35 U/L (ref 13–60)
LYMPHOCYTES # BLD AUTO: 2.03 10*3/MM3 (ref 0.7–3.1)
LYMPHOCYTES NFR BLD AUTO: 30.6 % (ref 19.6–45.3)
MCH RBC QN AUTO: 28.4 PG (ref 26.6–33)
MCHC RBC AUTO-ENTMCNC: 34.2 G/DL (ref 31.5–35.7)
MCV RBC AUTO: 83 FL (ref 79–97)
MONOCYTES # BLD AUTO: 0.51 10*3/MM3 (ref 0.1–0.9)
MONOCYTES NFR BLD AUTO: 7.7 % (ref 5–12)
NEUTROPHILS NFR BLD AUTO: 3.95 10*3/MM3 (ref 1.7–7)
NEUTROPHILS NFR BLD AUTO: 59.3 % (ref 42.7–76)
NRBC BLD AUTO-RTO: 0 /100 WBC (ref 0–0.2)
PLATELET # BLD AUTO: 287 10*3/MM3 (ref 140–450)
PMV BLD AUTO: 10.4 FL (ref 6–12)
POTASSIUM SERPL-SCNC: 4.1 MMOL/L (ref 3.5–5.2)
PROT SERPL-MCNC: 7 G/DL (ref 6–8.5)
RBC # BLD AUTO: 4.54 10*6/MM3 (ref 3.77–5.28)
SODIUM SERPL-SCNC: 138 MMOL/L (ref 136–145)
WBC NRBC COR # BLD: 6.64 10*3/MM3 (ref 3.4–10.8)

## 2022-09-19 PROCEDURE — 82150 ASSAY OF AMYLASE: CPT

## 2022-09-19 PROCEDURE — 85025 COMPLETE CBC W/AUTO DIFF WBC: CPT

## 2022-09-19 PROCEDURE — 80053 COMPREHEN METABOLIC PANEL: CPT

## 2022-09-19 PROCEDURE — 36415 COLL VENOUS BLD VENIPUNCTURE: CPT

## 2022-09-19 PROCEDURE — 83690 ASSAY OF LIPASE: CPT

## 2022-09-19 NOTE — PROGRESS NOTES
CC: Left lower quadrant and epigastric abdominal pain     HPI: Patient is a very pleasant 47-year-old female that was referred by David Garcia NP for evaluation of abdominal pain.  The patient reports intermittent episodes of epigastric and left upper quadrant abdominal pain that usually happen after eating.  The pain is mostly located on the left side of the abdomen left lower quadrant but sometimes in the left upper quadrant.  Pain is investigated over the epigastric area.  The pain happen several minutes after eating.  The pain last for several hours and is moderate in intensity.  He has been associated with intermittent episodes of loose and dark stools, abdominal bloating.  She denies any nausea or vomiting.  She has history of [place]bladder [place] medically.  She reports no other recent change of bowel habits.  She has not recognized any specific triggers but eats a diet high in fats and carbohydrates.  Patient has history of H pylori that was treated 2 years ago.  She reports having a confirmation test for eradication after treatment.  She recently underwent upper endoscopy with Dr. Holden that was essentially negative.  She has been treated with omeprazole and pantoprazole without much improvement of her symptoms.  He recommended HIDA scan that was never performed.  Patient had CT scan abdomen pelvis that showed large myoma.  She denies taking NSAIDs for pain.  Reports no recent weight loss.     PMH: H. pylori, arthritis, kidney stones     PSH:   - Upper endoscopy 3/20/2021  - Breast biopsy 6/2020  - Marsupialization of Bartholin gland cyst    MEDS: Stahist AD, Xyzal, Prilosec     ALL: No known drug allergies    FH and SH: Father with throat cancer, sister with lung cancer at age 42.  Patient denies smoking drinking or taking any drugs.  Drinks caffeine daily.  She is single     ROS:   Constitutional: denies any weight changes, fatigue or weakness.  HEENT: Denies  "hearing loss and rhinorrhea  Cardiovascular: denies chest pain, palpitations, edemas.  Respiratory: denies cough, sputum, SOB.  Gastrointestinal: Reports abdominal distention, pain, alternating constipation and diarrhea and nausea.  Genitourinary: denies dysuria, frequency.  Reports urinary urgency  Endocrine: denies cold intolerance, lethargy and flushing.  Hem: denies excessive bruising and postop bleeding.  Musculoskeletal: denies weakness, joint swelling, pain or stiffness.  Neuro: denies seizures, CVA, paresthesia, or peripheral neuropathy.   Skin: denies change in nevi, rashes, masses.     PE:   Vitals:    09/16/22 0911   Weight: 68 kg (150 lb)   Height: 152.4 cm (60\")     Body mass index is 29.29 kg/m².   Alert and oriented ×3, no acute distress.  Head is normocephalic and atraumatic.  Neck is supple there is no thyromegaly or lymphadenopathy  Chest is clear bilaterally there is no added sounds  Regular rate and rhythm, no murmurs  Abdomen is soft and mildly tender over the left side of the abdomen and epigastric area, is nondistended, bowel sounds are positive.  There is no rebound or guarding is and there is no peritoneal signs.  No clubbing cyanosis or edema in lower or upper extremities    Diagnostic studies:   CT scan abdomen pelvis 1/17/2022, only report available: IMPRESSION:     1. No CT evidence for acute intra-abdominal or pelvic abnormality related to clinical history of epigastric pain.   2. Leiomyomatous uterus.     Pelvic ultrasound 8/10/2022, only report available: IMPRESSION:   1. Enlarged leiomyomatous uterus with dominant uterine fundal fibroids.   2. Normal appearance of uterine endometrial echo complex.   3. Nonvisualization of the right ovary.   4. Normal ultrasound appearance of the left ovary    Labs from 1/17/2022 were reviewed by me and there were essentially normal  with only mild elevation of glucose    Biopsy report from upper endoscopy from 3/22/2021 that was reviewed by " me.  Final Diagnosis   1. Duodenum, Biopsy:  Benign small bowel mucosa with               A.  Normal intact villous surface.               B.  No significant inflammation, no granulomas.               C.  No viral inclusions or other organisms on routinely stained sections.     2. Stomach, Antrum, Biopsy:               A. Reactive gastropathy.               B. Negative for Helicobacter by routine staining.               C. No metaplasia, dysplasia nor malignancy identified.     3.Stomach, Body, Biopsy:               A. Fragments of benign gastric oxyntic mucosa without diagnostic abnormality.     Assessment and plan    The patient is a very pleasant 47-year-old female with alternating constipation diarrhea, abdominal pain, recent change of bowel habits, history of H. pylori, nausea and abnormal imaging of the uterus.  I discussed with the patient about further step.  I think she should get a repeat labs with CBC, CMP, lipase and amylase.  I think she should get also a UA to assess for her urinary urgency.  I would recommend that she gets abdominal ultrasound as well as HIDA scan of the gallbladder.  She has mostly constipation and I recommend that she starts bowel regimen with stool softeners and fiber supplementation of a daily basis.  I think she should have colonoscopy for evaluation of her recent change of bowel habits and upper endoscopy for recurrent abdominal symptoms while on antiacid medication.  I would also refer her to gynecology for evaluation and possible treatment of enlarged uterus.  Risk and benefits of procedure including bleeding, infection, perforation discussed in detail with the patient that verbalized understanding and agree with the plan.    Patient verbalized understanding and agree with the plan.  She was given time to ask questions and all were answered     Jose Pascual MD  General, Minimally Invasive and Endoscopic Surgery  [facility] [address], [facility]  [address]  [place], [place], [address]  P: [phone]  F: [phone]

## 2022-09-20 ENCOUNTER — TELEPHONE (OUTPATIENT)
Dept: SURGERY | Facility: CLINIC | Age: 47
End: 2022-09-20

## 2022-09-22 ENCOUNTER — TELEPHONE (OUTPATIENT)
Dept: SURGERY | Facility: CLINIC | Age: 47
End: 2022-09-22

## 2022-10-06 ENCOUNTER — ANESTHESIA (OUTPATIENT)
Dept: GASTROENTEROLOGY | Facility: HOSPITAL | Age: 47
End: 2022-10-06

## 2022-10-06 ENCOUNTER — ANESTHESIA EVENT (OUTPATIENT)
Dept: GASTROENTEROLOGY | Facility: HOSPITAL | Age: 47
End: 2022-10-06

## 2022-10-06 ENCOUNTER — HOSPITAL ENCOUNTER (OUTPATIENT)
Facility: HOSPITAL | Age: 47
Setting detail: HOSPITAL OUTPATIENT SURGERY
Discharge: HOME | End: 2022-10-06
Attending: SURGERY | Admitting: SURGERY

## 2022-10-06 VITALS
BODY MASS INDEX: 28.47 KG/M2 | DIASTOLIC BLOOD PRESSURE: 64 MMHG | OXYGEN SATURATION: 100 % | SYSTOLIC BLOOD PRESSURE: 104 MMHG | HEART RATE: 83 BPM | RESPIRATION RATE: 16 BRPM | HEIGHT: 60 IN | WEIGHT: 145 LBS

## 2022-10-06 DIAGNOSIS — R10.13 ABDOMINAL PAIN, EPIGASTRIC: ICD-10-CM

## 2022-10-06 PROBLEM — K64.8 INTERNAL HEMORRHOIDS: Status: ACTIVE | Noted: 2022-10-06

## 2022-10-06 PROBLEM — K63.5 COLON POLYPS: Status: ACTIVE | Noted: 2022-10-06

## 2022-10-06 LAB
B-HCG UR QL: NEGATIVE
EXPIRATION DATE: NORMAL
INTERNAL NEGATIVE CONTROL: NEGATIVE
INTERNAL POSITIVE CONTROL: POSITIVE
Lab: NORMAL

## 2022-10-06 PROCEDURE — 88305 TISSUE EXAM BY PATHOLOGIST: CPT | Performed by: SURGERY

## 2022-10-06 PROCEDURE — 25010000002 PROPOFOL 10 MG/ML EMULSION: Performed by: NURSE ANESTHETIST, CERTIFIED REGISTERED

## 2022-10-06 PROCEDURE — 81025 URINE PREGNANCY TEST: CPT | Performed by: SURGERY

## 2022-10-06 RX ORDER — PROPOFOL 10 MG/ML
VIAL (ML) INTRAVENOUS CONTINUOUS PRN
Status: DISCONTINUED | OUTPATIENT
Start: 2022-10-06 | End: 2022-10-06 | Stop reason: SURG

## 2022-10-06 RX ORDER — SODIUM CHLORIDE, SODIUM LACTATE, POTASSIUM CHLORIDE, CALCIUM CHLORIDE 600; 310; 30; 20 MG/100ML; MG/100ML; MG/100ML; MG/100ML
1000 INJECTION, SOLUTION INTRAVENOUS CONTINUOUS
Status: DISCONTINUED | OUTPATIENT
Start: 2022-10-06 | End: 2022-10-06 | Stop reason: HOSPADM

## 2022-10-06 RX ORDER — LIDOCAINE HYDROCHLORIDE 20 MG/ML
INJECTION, SOLUTION INFILTRATION; PERINEURAL AS NEEDED
Status: DISCONTINUED | OUTPATIENT
Start: 2022-10-06 | End: 2022-10-06 | Stop reason: SURG

## 2022-10-06 RX ORDER — SODIUM CHLORIDE 0.9 % (FLUSH) 0.9 %
10 SYRINGE (ML) INJECTION AS NEEDED
Status: DISCONTINUED | OUTPATIENT
Start: 2022-10-06 | End: 2022-10-06 | Stop reason: HOSPADM

## 2022-10-06 RX ADMIN — LIDOCAINE HYDROCHLORIDE 50 MG: 20 INJECTION, SOLUTION INFILTRATION; PERINEURAL at 09:40

## 2022-10-06 RX ADMIN — PROPOFOL 250 MCG/KG/MIN: 10 INJECTION, EMULSION INTRAVENOUS at 09:40

## 2022-10-06 RX ADMIN — SODIUM CHLORIDE, POTASSIUM CHLORIDE, SODIUM LACTATE AND CALCIUM CHLORIDE 1000 ML: 600; 310; 30; 20 INJECTION, SOLUTION INTRAVENOUS at 09:21

## 2022-10-06 NOTE — ANESTHESIA PREPROCEDURE EVALUATION
Anesthesia Evaluation     Patient summary reviewed   NPO Solid Status: > 8 hours  NPO Liquid Status: > 2 hours           Airway   Mallampati: I  TM distance: >3 FB  Neck ROM: full  Dental - normal exam     Pulmonary - normal exam   (-) shortness of breath  Cardiovascular   Exercise tolerance: good (4-7 METS)    Rhythm: regular  Rate: normal    (+) hyperlipidemia,   (-) angina, PALOMARES      Neuro/Psych  GI/Hepatic/Renal/Endo    (+)  PUD,      Musculoskeletal     Abdominal    Substance History      OB/GYN          Other                          Anesthesia Plan    ASA 2     MAC     (MAC anesthesia discussed with patient )  intravenous induction     Anesthetic plan, risks, benefits, and alternatives have been provided, discussed and informed consent has been obtained with: patient.

## 2022-10-06 NOTE — ANESTHESIA POSTPROCEDURE EVALUATION
"Patient: Jeanie Elam    Procedure Summary     Date: 10/06/22 Room / Location:  [facility] /  [facility]    Anesthesia Start: 0937 Anesthesia Stop: 1010    Procedures:       ESOPHAGOGASTRODUODENOSCOPY WITH BIOPSY (N/A Esophagus)      COLONOSCOPY TO CECUM WITH POLYPECTOMY ( COLD BX) (N/A ) Diagnosis:       Abdominal pain, epigastric      (Abdominal pain, epigastric [R10.13])    Surgeons: Jose Pascual MD Provider: Dixon Matos MD    Anesthesia Type: MAC ASA Status: 2          Anesthesia Type: MAC    Vitals  No vitals data found for the desired time range.          Post Anesthesia Care and Evaluation    Level of consciousness: awake and alert  Pain management: adequate  Anesthetic complications: No anesthetic complications    Cardiovascular status: acceptable  Respiratory status: acceptable  Hydration status: acceptable    Comments: /62 (BP Location: Left arm, Patient Position: Lying)   Pulse 86   Resp 16   Ht 152.4 cm (60\")   Wt 65.8 kg (145 lb)   SpO2 98%   BMI 28.32 kg/m²         "

## 2022-10-07 ENCOUNTER — HOSPITAL ENCOUNTER (OUTPATIENT)
Dept: ULTRASOUND IMAGING | Facility: HOSPITAL | Age: 47
Discharge: HOME | End: 2022-10-07
Admitting: SURGERY

## 2022-10-07 ENCOUNTER — HOSPITAL ENCOUNTER (OUTPATIENT)
Dept: NUCLEAR MEDICINE | Facility: HOSPITAL | Age: 47
Discharge: HOME | End: 2022-10-07

## 2022-10-07 DIAGNOSIS — R10.13 ABDOMINAL PAIN, EPIGASTRIC: ICD-10-CM

## 2022-10-07 LAB
LAB AP CASE REPORT: NORMAL
PATH REPORT.FINAL DX SPEC: NORMAL
PATH REPORT.GROSS SPEC: NORMAL

## 2022-10-07 PROCEDURE — 78227 HEPATOBIL SYST IMAGE W/DRUG: CPT

## 2022-10-07 PROCEDURE — 0 TECHNETIUM TC 99M MEBROFENIN KIT: Performed by: SURGERY

## 2022-10-07 PROCEDURE — 25010000002 SINCALIDE PER 5 MCG: Performed by: SURGERY

## 2022-10-07 PROCEDURE — 76700 US EXAM ABDOM COMPLETE: CPT

## 2022-10-07 PROCEDURE — A9537 TC99M MEBROFENIN: HCPCS | Performed by: SURGERY

## 2022-10-07 RX ORDER — KIT FOR THE PREPARATION OF TECHNETIUM TC 99M MEBROFENIN 45 MG/10ML
1 INJECTION, POWDER, LYOPHILIZED, FOR SOLUTION INTRAVENOUS
Status: COMPLETED | OUTPATIENT
Start: 2022-10-07 | End: 2022-10-07

## 2022-10-07 RX ADMIN — MEBROFENIN 1 DOSE: 45 INJECTION, POWDER, LYOPHILIZED, FOR SOLUTION INTRAVENOUS at 07:53

## 2022-10-07 RX ADMIN — SODIUM CHLORIDE 1.3 MCG: 9 INJECTION, SOLUTION INTRAVENOUS at 09:14

## 2022-10-10 ENCOUNTER — TELEPHONE (OUTPATIENT)
Dept: SURGERY | Facility: CLINIC | Age: 47
End: 2022-10-10

## 2022-10-10 NOTE — TELEPHONE ENCOUNTER
I called the patient regarding her colonoscopy, endoscopy, abdominal ultrasound and HIDA scan results.    Colonoscopy was essentially normal with a hyperplastic polyps upper endoscopy show mild chronic gastritis of the prepyloric area.    Abdominal ultrasound and HIDA scan were essentially normal.  Discussed with the patient about the need to take stool softeners and Senokot as needed.  High-fiber and healthy diet.    Will need to have a colonoscopy in 10 years for screening purposes omeprazole for 2 months  I recommend that she follow-up with a gynecologist for evaluation of leiomyomas    She understood

## 2022-12-06 ENCOUNTER — OFFICE VISIT (OUTPATIENT)
Dept: OBSTETRICS AND GYNECOLOGY | Age: 47
End: 2022-12-06

## 2022-12-06 VITALS
HEIGHT: 60 IN | SYSTOLIC BLOOD PRESSURE: 118 MMHG | WEIGHT: 153 LBS | DIASTOLIC BLOOD PRESSURE: 74 MMHG | BODY MASS INDEX: 30.04 KG/M2

## 2022-12-06 DIAGNOSIS — Z12.4 SCREENING FOR CERVICAL CANCER: ICD-10-CM

## 2022-12-06 DIAGNOSIS — R30.0 DYSURIA: ICD-10-CM

## 2022-12-06 DIAGNOSIS — D21.9 FIBROIDS: ICD-10-CM

## 2022-12-06 DIAGNOSIS — Z11.3 SCREEN FOR STD (SEXUALLY TRANSMITTED DISEASE): ICD-10-CM

## 2022-12-06 DIAGNOSIS — Z01.419 WELL WOMAN EXAM WITH ROUTINE GYNECOLOGICAL EXAM: Primary | ICD-10-CM

## 2022-12-06 DIAGNOSIS — Z13.89 SCREENING FOR BLOOD OR PROTEIN IN URINE: ICD-10-CM

## 2022-12-06 DIAGNOSIS — N39.498 OTHER URINARY INCONTINENCE: ICD-10-CM

## 2022-12-06 PROBLEM — R32 ABSENCE OF BLADDER CONTINENCE: Status: ACTIVE | Noted: 2022-12-06

## 2022-12-06 LAB
BILIRUB BLD-MCNC: NEGATIVE MG/DL
CLARITY, POC: CLEAR
COLOR UR: YELLOW
GLUCOSE UR STRIP-MCNC: NEGATIVE MG/DL
KETONES UR QL: ABNORMAL
LEUKOCYTE EST, POC: NEGATIVE
NITRITE UR-MCNC: NEGATIVE MG/ML
PH UR: 6 [PH] (ref 5–8)
PROT UR STRIP-MCNC: NEGATIVE MG/DL
RBC # UR STRIP: ABNORMAL /UL
SP GR UR: 1.03 (ref 1–1.03)
UROBILINOGEN UR QL: ABNORMAL

## 2022-12-06 PROCEDURE — 2014F MENTAL STATUS ASSESS: CPT | Performed by: NURSE PRACTITIONER

## 2022-12-06 PROCEDURE — 99396 PREV VISIT EST AGE 40-64: CPT | Performed by: NURSE PRACTITIONER

## 2022-12-06 PROCEDURE — 99213 OFFICE O/P EST LOW 20 MIN: CPT | Performed by: NURSE PRACTITIONER

## 2022-12-06 NOTE — PROGRESS NOTES
Subjective     Chief Complaint   Patient presents with   • Abdominal Pain     Still having lots of abdominal pain, causing her to urinate on herself.  Burning when urinates       History of Present Illness    Jeanie Elam is a 47 y.o. No obstetric history on file. who presents for annual exam.    New GYN  Due for annual and pap smear  Mammo UTD - she is getting every 6 months, reports reviewed   She is here to discuss her fibroids   She continues to have abdominal pain, urine leakage, painful IC  Menses - She has started having twice a month, heavy with clots, lasting 3-4 days  This bleeding pattern has been going on for > 6 month   She also complaints of urine leakage > year  She had a pelvic US in august showing enlarged uterus and multiple fibroids around fundus with largest measuring 5 x 4.7 x 3.4 cm  She had a more recent CT scan showing 5.9 cm fibroid  She is SA with one partner, ok with Std screening today  She had UTI last month, still noting some dysuria, will reculture today  Pt will see Dr. Masterson       Obstetric History:  OB History    No obstetric history on file.        Menstrual History:     Patient's last menstrual period was 12/02/2022.         Current contraception: none  History of abnormal Pap smear: no  Received Gardasil immunization: no  Perform regular self breast exam: yes - .  Family history of uterine or ovarian cancer: no  Family History of colon cancer: no  Family history of breast cancer: no    Mammogram: up to date   Colonoscopy: up to date.  DEXA: not indicated.    Exercise: moderately active  Calcium/Vitamin D: adequate intake    The following portions of the patient's history were reviewed and updated as appropriate: allergies, current medications, past family history, past medical history, past social history, past surgical history and problem list.    Review of Systems   Constitutional: Negative.    Respiratory: Negative.    Cardiovascular: Negative.    Gastrointestinal:  Negative.    Genitourinary: Positive for dyspareunia, dysuria and pelvic pain.        Urinary incontinence   Skin: Negative.    Psychiatric/Behavioral: Negative.            Objective   Physical Exam  Constitutional:       General: She is awake.      Appearance: Normal appearance. She is well-developed.   HENT:      Head: Normocephalic and atraumatic.      Nose: Nose normal.   Neck:      Thyroid: No thyroid mass, thyromegaly or thyroid tenderness.   Cardiovascular:      Rate and Rhythm: Normal rate and regular rhythm.      Pulses: Normal pulses.      Heart sounds: Normal heart sounds.   Pulmonary:      Effort: Pulmonary effort is normal.      Breath sounds: Normal breath sounds.   Chest:   Breasts:     Breasts are symmetrical.      Right: Normal. No swelling, bleeding, inverted nipple, mass, nipple discharge, skin change or tenderness.      Left: Normal. No swelling, bleeding, inverted nipple, mass, nipple discharge, skin change or tenderness.   Abdominal:      General: Abdomen is flat. Bowel sounds are normal.      Palpations: Abdomen is soft.      Tenderness: There is no abdominal tenderness.   Genitourinary:     General: Normal vulva.      Labia:         Right: No rash, tenderness, lesion or injury.         Left: No rash, tenderness, lesion or injury.       Urethra: No prolapse, urethral pain, urethral swelling or urethral lesion.      Vagina: Normal. No signs of injury. No vaginal discharge, erythema, tenderness, bleeding, lesions or prolapsed vaginal walls.      Cervix: No discharge, friability, lesion, erythema or cervical bleeding.      Uterus: Enlarged. Not tender and no uterine prolapse.       Adnexa: Right adnexa normal and left adnexa normal.        Right: No mass, tenderness or fullness.          Left: No mass, tenderness or fullness.        Rectum: Normal. No mass.   Musculoskeletal:      Cervical back: Normal range of motion and neck supple.   Lymphadenopathy:      Upper Body:      Right upper body: No  "supraclavicular adenopathy.      Left upper body: No supraclavicular adenopathy.   Skin:     General: Skin is warm and dry.   Neurological:      General: No focal deficit present.      Mental Status: She is alert and oriented to person, place, and time.   Psychiatric:         Mood and Affect: Mood normal.         Behavior: Behavior normal. Behavior is cooperative.         Thought Content: Thought content normal.         Judgment: Judgment normal.         /74   Ht 152.4 cm (60\")   Wt 69.4 kg (153 lb)   LMP 12/02/2022   BMI 29.88 kg/m²     Assessment & Plan   Diagnoses and all orders for this visit:    1. Well woman exam with routine gynecological exam (Primary)    2. Screening for blood or protein in urine  -     POC Urinalysis Dipstick    3. Dysuria  -     Urine Culture - Urine, Urine, Clean Catch    4. Screening for cervical cancer  -     IGP,CtNgTv,Apt HPV,rfx 16 / 18,45    5. Fibroids    6. Other urinary incontinence    7. Screen for STD (sexually transmitted disease)  -     IGP,CtNgTv,Apt HPV,rfx 16 / 18,45        All questions answered.  Breast self exam technique reviewed and patient encouraged to perform self-exam monthly.  Discussed healthy lifestyle modifications.  Recommended 30 minutes of aerobic exercise five times per week.  Discussed calcium needs to prevent osteoporosis.    -Pap today  -Mammo UTD  -Pt has had colonoscopy and EGD for other abdominal pain w/u and essentially unrmarkable  -Discussed options for bleeding/fibroids. She desires a hysterectomy due to ongoing heavy periods, pelvic pain, urinary incontinence and dyspareunia   -Plan follow up with Dr. Masterson to further discuss this option               "

## 2022-12-08 LAB
BACTERIA UR CULT: NO GROWTH
BACTERIA UR CULT: NORMAL

## 2022-12-12 LAB
C TRACH RRNA CVX QL NAA+PROBE: NEGATIVE
CYTOLOGIST CVX/VAG CYTO: NORMAL
CYTOLOGY CVX/VAG DOC CYTO: NORMAL
CYTOLOGY CVX/VAG DOC THIN PREP: NORMAL
DX ICD CODE: NORMAL
HIV 1 & 2 AB SER-IMP: NORMAL
HPV GENOTYPE REFLEX: NORMAL
HPV I/H RISK 4 DNA CVX QL PROBE+SIG AMP: NEGATIVE
N GONORRHOEA RRNA CVX QL NAA+PROBE: NEGATIVE
OTHER STN SPEC: NORMAL
STAT OF ADQ CVX/VAG CYTO-IMP: NORMAL
T VAGINALIS RRNA SPEC QL NAA+PROBE: NEGATIVE

## 2022-12-14 ENCOUNTER — OFFICE VISIT (OUTPATIENT)
Dept: OBSTETRICS AND GYNECOLOGY | Age: 47
End: 2022-12-14

## 2022-12-14 VITALS
WEIGHT: 151.8 LBS | BODY MASS INDEX: 29.8 KG/M2 | SYSTOLIC BLOOD PRESSURE: 118 MMHG | HEIGHT: 60 IN | DIASTOLIC BLOOD PRESSURE: 62 MMHG

## 2022-12-14 DIAGNOSIS — R87.619 ENDOMETRIAL CELLS ON CERVICAL PAP SMEAR INCONSISTENT WITH LAST MENSTRUAL PERIOD: ICD-10-CM

## 2022-12-14 DIAGNOSIS — N93.9 ABNORMAL UTERINE BLEEDING (AUB): Primary | ICD-10-CM

## 2022-12-14 DIAGNOSIS — D25.9 UTERINE LEIOMYOMA, UNSPECIFIED LOCATION: ICD-10-CM

## 2022-12-14 DIAGNOSIS — Z01.818 PREPROCEDURAL EXAMINATION: ICD-10-CM

## 2022-12-14 PROBLEM — R32 ABSENCE OF BLADDER CONTINENCE: Status: RESOLVED | Noted: 2022-12-06 | Resolved: 2022-12-14

## 2022-12-14 PROBLEM — R11.0 NAUSEA: Status: RESOLVED | Noted: 2021-03-08 | Resolved: 2022-12-14

## 2022-12-14 LAB
B-HCG UR QL: NEGATIVE
EXPIRATION DATE: NORMAL
INTERNAL NEGATIVE CONTROL: NORMAL
INTERNAL POSITIVE CONTROL: NORMAL
Lab: NORMAL

## 2022-12-14 PROCEDURE — 58100 BIOPSY OF UTERUS LINING: CPT | Performed by: STUDENT IN AN ORGANIZED HEALTH CARE EDUCATION/TRAINING PROGRAM

## 2022-12-14 PROCEDURE — 99214 OFFICE O/P EST MOD 30 MIN: CPT | Performed by: STUDENT IN AN ORGANIZED HEALTH CARE EDUCATION/TRAINING PROGRAM

## 2022-12-14 PROCEDURE — 81025 URINE PREGNANCY TEST: CPT | Performed by: STUDENT IN AN ORGANIZED HEALTH CARE EDUCATION/TRAINING PROGRAM

## 2022-12-14 RX ORDER — ACETAMINOPHEN 500 MG
1000 TABLET ORAL ONCE
Status: CANCELLED | OUTPATIENT
Start: 2023-02-23 | End: 2022-12-14

## 2022-12-14 RX ORDER — SODIUM CHLORIDE 0.9 % (FLUSH) 0.9 %
10 SYRINGE (ML) INJECTION AS NEEDED
Status: CANCELLED | OUTPATIENT
Start: 2023-02-23

## 2022-12-14 RX ORDER — SCOLOPAMINE TRANSDERMAL SYSTEM 1 MG/1
1 PATCH, EXTENDED RELEASE TRANSDERMAL CONTINUOUS
Status: CANCELLED | OUTPATIENT
Start: 2023-02-23 | End: 2023-02-26

## 2022-12-14 RX ORDER — PHENAZOPYRIDINE HYDROCHLORIDE 200 MG/1
200 TABLET, FILM COATED ORAL ONCE
Status: CANCELLED | OUTPATIENT
Start: 2023-02-23 | End: 2022-12-14

## 2022-12-14 RX ORDER — SODIUM CHLORIDE 0.9 % (FLUSH) 0.9 %
3 SYRINGE (ML) INJECTION EVERY 12 HOURS SCHEDULED
Status: CANCELLED | OUTPATIENT
Start: 2023-02-23

## 2022-12-14 RX ORDER — CEFAZOLIN SODIUM 2 G/100ML
2 INJECTION, SOLUTION INTRAVENOUS ONCE
Status: CANCELLED | OUTPATIENT
Start: 2023-02-23 | End: 2022-12-14

## 2022-12-14 RX ORDER — SODIUM CHLORIDE 9 MG/ML
40 INJECTION, SOLUTION INTRAVENOUS AS NEEDED
Status: CANCELLED | OUTPATIENT
Start: 2023-02-23

## 2022-12-14 RX ORDER — GABAPENTIN 300 MG/1
600 CAPSULE ORAL ONCE
Status: CANCELLED | OUTPATIENT
Start: 2023-02-23 | End: 2022-12-14

## 2022-12-14 NOTE — PROGRESS NOTES
[facility]   Obstetrics and Gynecology     2022      Patient:  Jeanie Elam   MR#:[id number]    Office note    Chief Complaint   Patient presents with   • Follow-up     Gyn follow up hysterectomy consult - fibroids       Subjective     History of Present Illness  47 y.o. female  presents with AUB and fibroids.  She is having heavy bleeding with clots lasting 3 to 4 days and occurring every 2 to 3 weeks.  She also reports a pelvic pressure with intermittent stabbing pain in her vagina.  She feels like the symptoms have worsened significantly over the last 6 to 8 months.  Recent Pap smear was normal but did note the presence of endometrial cells.  She has not had any endometrial sampling.  Recent TSH normal.      Relevant data reviewed:  IGP,CtNgTv,Apt HPV,rfx 16 / 18,45 (2022 14:49) - NIL with endometrial cells present  TSH (10/14/2022 10:38) normal  CBC & Differential (2022 09:47)  CT Abdomen & Pelvis W IV Contrast Oral Contrast if Indicated by Radiology (2022 10:50)  US Pelvic, Non OB, Transvaginal Only (08/10/2022 16:02) - uterus 8 x 8 x4 cm with multiple fibroids, largest 5cm at fundus, normal ovaries     #[id number] and [id number]    Patient Active Problem List   Diagnosis   • History of Helicobacter pylori infection   • Internal hemorrhoids   • Colon polyps   • Fibroids   • Abnormal uterine bleeding (AUB)       Past Medical History:   Diagnosis Date   • Arthritis    • Fibroid    • H pylori ulcer    • Hyperlipidemia      Past Surgical History:   Procedure Laterality Date   • COLONOSCOPY N/A 10/6/2022    Procedure: COLONOSCOPY TO CECUM WITH POLYPECTOMY ( COLD BX);  Surgeon: Jose Pascual MD;  Location: [facility] ENDOSCOPY;  Service: General;  Laterality: N/A;  SCREENING  POST:COLON POLYP; HEMORRHOIDS   • ENDOSCOPY N/A 3/22/2021    Procedure: ESOPHAGOGASTRODUODENOSCOPY WITH COLD BX'S;  Surgeon: Joseph Holden MD;  Location: [facility]  ENDOSCOPY;  Service: Gastroenterology;  Laterality: N/A;  pre: EPIGASTRIC PAIN, HX H. PYLORI  post: NORMAL   • ENDOSCOPY N/A 10/6/2022    Procedure: ESOPHAGOGASTRODUODENOSCOPY WITH BIOPSY;  Surgeon: Jose Pascual MD;  Location: [facility] ENDOSCOPY;  Service: General;  Laterality: N/A;  EPIGASTRIC PAIN  POST:HIATAL HERNIA   • I & D / EXCISION MARSUPIALIZATION BARTHOLIN'S GLAND CYST / LYSIS LESIONS       Obstetric History:  OB History        1    Para   1    Term   0       1    AB        Living   1       SAB        IAB        Ectopic        Molar        Multiple        Live Births   1               Menstrual History:     Patient's last menstrual period was 2022 (exact date).       # 1 - Date: , Sex: Male, Weight: 907 g (2 lb), GA: None, Delivery: Vaginal, Spontaneous, Apgar1: None, Apgar5: None, Living: Living, Birth Comments: None    Family History   Problem Relation Age of Onset   • Throat cancer Father    • Arthritis Mother    • Hypertension Mother    • Lung cancer Sister    • Colon cancer Other    • Breast cancer Neg Hx    • Uterine cancer Neg Hx    • Ovarian cancer Neg Hx      Social History     Tobacco Use   • Smoking status: Never   • Smokeless tobacco: Never   Vaping Use   • Vaping Use: Never used   Substance Use Topics   • Alcohol use: Not Currently     Alcohol/week: 1.0 standard drink     Types: 1 Standard drinks or equivalent per week     Comment: 1 glass per month   • Drug use: Never     Patient has no known allergies.    Current Outpatient Medications:   •  levocetirizine (XYZAL) 5 MG tablet, Take 5 mg by mouth Every Evening., Disp: , Rfl:   •  omeprazole (priLOSEC) 40 MG capsule, Take 40 mg by mouth Daily., Disp: , Rfl:   •  sennosides-docusate (senna-docusate sodium) 8.6-50 MG per tablet, Take 1 tablet by mouth Daily., Disp: 60 tablet, Rfl: 2  •  Stahist AD 25-60 MG tablet, Take 1 tablet by mouth Every 8 (Eight) Hours As Needed., Disp: , Rfl:     The following portions  "of the patient's history were reviewed and updated as appropriate: allergies, current medications, past family history, past medical history, past social history, past surgical history and problem list.    Review of Systems   Genitourinary: Positive for vaginal bleeding and vaginal pain.   All other systems reviewed and are negative.      BP Readings from Last 3 Encounters:   12/14/22 118/62   12/06/22 118/74   10/06/22 104/64      Wt Readings from Last 3 Encounters:   12/14/22 68.9 kg (151 lb 12.8 oz)   12/06/22 69.4 kg (153 lb)   10/06/22 65.8 kg (145 lb)      BMI: Estimated body mass index is 29.65 kg/m² as calculated from the following:    Height as of this encounter: 152.4 cm (60\").    Weight as of this encounter: 68.9 kg (151 lb 12.8 oz). BSA: Estimated body surface area is 1.66 meters squared as calculated from the following:    Height as of this encounter: 152.4 cm (60\").    Weight as of this encounter: 68.9 kg (151 lb 12.8 oz).    Objective   Physical Exam  Vitals and nursing note reviewed.   Constitutional:       General: She is not in acute distress.     Appearance: Normal appearance.   Pulmonary:      Effort: Pulmonary effort is normal. No respiratory distress.   Abdominal:      Palpations: Abdomen is soft.      Tenderness: There is no abdominal tenderness. There is no guarding or rebound.   Genitourinary:     General: Normal vulva.      Vagina: Normal.      Uterus: Normal. Enlarged (14-16 wk bulky uterus, deflecting cervix posteriorly and unable to visualize but palpates smooth and normal) and tender (mildly TTP during bimanual exam). Not fixed ( sufficiently mobile) and no uterine prolapse.       Adnexa: Right adnexa normal and left adnexa normal.      Comments: Vagina is narrow but accomodates two finger-breadths  Neurological:      General: No focal deficit present.      Mental Status: She is alert and oriented to person, place, and time.   Psychiatric:         Mood and Affect: Mood normal.         " Behavior: Behavior normal.         Thought Content: Thought content normal.         Judgment: Judgment normal.       Endometrial Biopsy Procedure:     Informed consent was obtained and risks described as bleeding, cramping, uterine perforation,and infection. She desired to proceed. A speculum was inserted into the vagina.  I attempted to visualize the cervix but it was deflected posteriorly due to her the bulk of the fibroids so I was unable to see more than the anterior edge.  I cleansed the cervix with Betadine and applied an Allis clamp to the visible edge.  I attempted endometrial biopsy but was unable to pass the Pipelle due to the acute angle of the cervix.  Decision made to halt the procedure due to patient discomfort.  Allis clamp was removed.  Hemostasis was ensured.  Patient tolerated the procedure well.    Assessment & Plan     Diagnoses and all orders for this visit:    1. Abnormal uterine bleeding (AUB) (Primary)  -     Case Request; Standing  -     Type & Screen; Future  -     sodium chloride 0.9 % flush 3 mL  -     sodium chloride 0.9 % flush 10 mL  -     sodium chloride 0.9 % infusion 40 mL  -     phenazopyridine (PYRIDIUM) tablet 200 mg  -     acetaminophen (TYLENOL) tablet 1,000 mg  -     gabapentin (NEURONTIN) capsule 600 mg  -     scopolamine patch 1 mg/72 hr  -     ceFAZolin (ANCEF) 2 g in sodium chloride 0.9 % 100 mL IVPB    2. Uterine leiomyoma, unspecified location  -     Case Request; Standing  -     Type & Screen; Future  -     sodium chloride 0.9 % flush 3 mL  -     sodium chloride 0.9 % flush 10 mL  -     sodium chloride 0.9 % infusion 40 mL  -     phenazopyridine (PYRIDIUM) tablet 200 mg  -     acetaminophen (TYLENOL) tablet 1,000 mg  -     gabapentin (NEURONTIN) capsule 600 mg  -     scopolamine patch 1 mg/72 hr  -     ceFAZolin (ANCEF) 2 g in sodium chloride 0.9 % 100 mL IVPB    3. Endometrial cells on cervical Pap smear inconsistent with last menstrual period    4. Preprocedural  examination  -     POC Pregnancy, Urine    Other orders  -     Initiate Observation Status; Standing  -     Follow Anesthesia Guidelines / Protocol; Future  -     Chlorhexidine Skin Prep; Future  -     Provide Patient With ERAS Hydration Instructions  -     Provide Patient With ERAS Booklet(s)/Handout  -     Follow Anesthesia Guidelines / Protocol; Standing  -     Obtain Informed Consent; Standing  -     Verify NPO Status; Standing  -     Verify The Time Patient Completed ERAS Hydration Drink; Standing  -     Place Sequential Compression Device; Standing  -     Verify / Perform Chlorhexidine Skin Prep; Standing  -     Insert Peripheral IV; Standing  -     Saline Lock & Maintain IV Access; Standing    -We discussed patient's AUB and fibroids, which is a reasonable explanation for her heavy and erratic bleeding.  However, we also discussed the presence of endometrial cells on a Pap smear > 46 yo is abnormal and should be evaluated with endometrial sampling.  Endometrial biopsy was attempted but unable to be completed due to the bulk of the fibroids deflecting her cervix posteriorly and being unable to pass the Pipelle through that acute angle.  I offered her hysteroscopy D&C to sample the endometrium and definitively rule out malignancy prior to hysterectomy but she declined.  She understands that if malignancy was identified on specimen from hysterectomy, I would refer her to oncology and she would likely need a second procedure to complete her staging.  She is okay with this risk and would like to proceed with surgery.  - Plan for total laparoscopic hysterectomy, bilateral salpingectomy and cystoscopy    -Minimally invasive approaches to hysterectomy were reviewed with the patient.  The surgical procedure was discussed with the patient in detail.  I discussed the risks of the surgical procedure including, but not limited to the risk of pain, bleeding, infection, and damage to internal organs.  In exceedingly rare  "cases, death has been reported from surgical complications involving hysterectomy.    -It is customary with this procedure to remove both fallopian tubes.  Research has suggested that fallopian tubes may be the source of a type of cancer that was previously attributed to the ovaries.  Also, it is usual practice to conserve the ovaries outside of significant pathology.   -In cases where extensive scar tissue, fibroids, or uncontrolled bleeding is encountered, it may become necessary to convert the procedure to an “open\" laparotomy hysterectomy involving a longer recovery.  Patient is open to excepting blood products if needed.  -The procedure entails very close operative proximity to the bladder and ureters.  There is a risk of injury to these structures.  It is usual practice to inspect the bladder and ensure functioning ureters at the conclusion of the procedure using cystoscopy (camera in the bladder).  In exceptionally straightforward cases, selective cystoscopy may be employed to reduce the incidence of iatrogenic urinary tract infection  -In addition to routine postoperative instructions provided, all patients are advised that they MUST avoid vaginal penetration for 6-8 weeks postoperative until the upper vaginal cuff is inspected for proper healing.  There is a rare incidence of vaginal cuff separation that can require emergent intervention.      Gina Masterson MD   12/14/2022 11:52 EST  "

## 2023-02-21 ENCOUNTER — PRE-ADMISSION TESTING (OUTPATIENT)
Dept: PREADMISSION TESTING | Facility: HOSPITAL | Age: 48
DRG: 743 | End: 2023-02-21
Payer: MEDICAID

## 2023-02-21 VITALS
HEIGHT: 60 IN | TEMPERATURE: 98.2 F | HEART RATE: 88 BPM | WEIGHT: 154.8 LBS | DIASTOLIC BLOOD PRESSURE: 85 MMHG | SYSTOLIC BLOOD PRESSURE: 139 MMHG | OXYGEN SATURATION: 97 % | RESPIRATION RATE: 16 BRPM | BODY MASS INDEX: 30.39 KG/M2

## 2023-02-21 DIAGNOSIS — N93.9 ABNORMAL UTERINE BLEEDING (AUB): ICD-10-CM

## 2023-02-21 DIAGNOSIS — D25.9 UTERINE LEIOMYOMA, UNSPECIFIED LOCATION: ICD-10-CM

## 2023-02-21 LAB
ABO GROUP BLD: NORMAL
ANION GAP SERPL CALCULATED.3IONS-SCNC: 8.1 MMOL/L (ref 5–15)
BLD GP AB SCN SERPL QL: NEGATIVE
BUN SERPL-MCNC: 9 MG/DL (ref 6–20)
BUN/CREAT SERPL: 12.9 (ref 7–25)
CALCIUM SPEC-SCNC: 9.3 MG/DL (ref 8.6–10.5)
CHLORIDE SERPL-SCNC: 105 MMOL/L (ref 98–107)
CO2 SERPL-SCNC: 24.9 MMOL/L (ref 22–29)
CREAT SERPL-MCNC: 0.7 MG/DL (ref 0.57–1)
DEPRECATED RDW RBC AUTO: 40.6 FL (ref 37–54)
EGFRCR SERPLBLD CKD-EPI 2021: 106.2 ML/MIN/1.73
ERYTHROCYTE [DISTWIDTH] IN BLOOD BY AUTOMATED COUNT: 13.1 % (ref 12.3–15.4)
GLUCOSE SERPL-MCNC: 100 MG/DL (ref 65–99)
HCG SERPL QL: NEGATIVE
HCT VFR BLD AUTO: 40.4 % (ref 34–46.6)
HGB BLD-MCNC: 13.3 G/DL (ref 12–15.9)
MCH RBC QN AUTO: 28.2 PG (ref 26.6–33)
MCHC RBC AUTO-ENTMCNC: 32.9 G/DL (ref 31.5–35.7)
MCV RBC AUTO: 85.6 FL (ref 79–97)
PLATELET # BLD AUTO: 289 10*3/MM3 (ref 140–450)
PMV BLD AUTO: 10.7 FL (ref 6–12)
POTASSIUM SERPL-SCNC: 4 MMOL/L (ref 3.5–5.2)
RBC # BLD AUTO: 4.72 10*6/MM3 (ref 3.77–5.28)
RH BLD: POSITIVE
SODIUM SERPL-SCNC: 138 MMOL/L (ref 136–145)
T&S EXPIRATION DATE: NORMAL
WBC NRBC COR # BLD: 5.85 10*3/MM3 (ref 3.4–10.8)

## 2023-02-21 PROCEDURE — 86850 RBC ANTIBODY SCREEN: CPT

## 2023-02-21 PROCEDURE — 84703 CHORIONIC GONADOTROPIN ASSAY: CPT

## 2023-02-21 PROCEDURE — 36415 COLL VENOUS BLD VENIPUNCTURE: CPT

## 2023-02-21 PROCEDURE — 80048 BASIC METABOLIC PNL TOTAL CA: CPT

## 2023-02-21 PROCEDURE — 86900 BLOOD TYPING SEROLOGIC ABO: CPT

## 2023-02-21 PROCEDURE — 85027 COMPLETE CBC AUTOMATED: CPT

## 2023-02-21 PROCEDURE — 86901 BLOOD TYPING SEROLOGIC RH(D): CPT

## 2023-02-21 RX ORDER — ERGOCALCIFEROL 1.25 MG/1
50000 CAPSULE ORAL
COMMUNITY
Start: 2022-10-17 | End: 2023-04-15

## 2023-02-21 NOTE — DISCHARGE INSTRUCTIONS

## 2023-02-23 ENCOUNTER — ANESTHESIA EVENT (OUTPATIENT)
Dept: PERIOP | Facility: HOSPITAL | Age: 48
DRG: 743 | End: 2023-02-23
Payer: MEDICAID

## 2023-02-23 ENCOUNTER — HOSPITAL ENCOUNTER (INPATIENT)
Facility: HOSPITAL | Age: 48
LOS: 1 days | Discharge: HOME | DRG: 743 | End: 2023-02-24
Attending: STUDENT IN AN ORGANIZED HEALTH CARE EDUCATION/TRAINING PROGRAM | Admitting: STUDENT IN AN ORGANIZED HEALTH CARE EDUCATION/TRAINING PROGRAM
Payer: MEDICAID

## 2023-02-23 ENCOUNTER — ANESTHESIA (OUTPATIENT)
Dept: PERIOP | Facility: HOSPITAL | Age: 48
DRG: 743 | End: 2023-02-23
Payer: MEDICAID

## 2023-02-23 DIAGNOSIS — D25.9 UTERINE LEIOMYOMA, UNSPECIFIED LOCATION: ICD-10-CM

## 2023-02-23 DIAGNOSIS — Z90.710 S/P LAPAROSCOPIC HYSTERECTOMY: Primary | ICD-10-CM

## 2023-02-23 DIAGNOSIS — N93.9 ABNORMAL UTERINE BLEEDING (AUB): ICD-10-CM

## 2023-02-23 LAB
ANION GAP SERPL CALCULATED.3IONS-SCNC: 11.6 MMOL/L (ref 5–15)
BASOPHILS # BLD AUTO: 0.03 10*3/MM3 (ref 0–0.2)
BASOPHILS NFR BLD AUTO: 0.2 % (ref 0–1.5)
BUN SERPL-MCNC: 8 MG/DL (ref 6–20)
BUN/CREAT SERPL: 9.1 (ref 7–25)
CALCIUM SPEC-SCNC: 8.3 MG/DL (ref 8.6–10.5)
CHLORIDE SERPL-SCNC: 101 MMOL/L (ref 98–107)
CO2 SERPL-SCNC: 20.4 MMOL/L (ref 22–29)
CREAT SERPL-MCNC: 0.88 MG/DL (ref 0.57–1)
DEPRECATED RDW RBC AUTO: 41 FL (ref 37–54)
EGFRCR SERPLBLD CKD-EPI 2021: 80.7 ML/MIN/1.73
EOSINOPHIL # BLD AUTO: 0.01 10*3/MM3 (ref 0–0.4)
EOSINOPHIL NFR BLD AUTO: 0.1 % (ref 0.3–6.2)
ERYTHROCYTE [DISTWIDTH] IN BLOOD BY AUTOMATED COUNT: 13.1 % (ref 12.3–15.4)
GLUCOSE SERPL-MCNC: 201 MG/DL (ref 65–99)
HCT VFR BLD AUTO: 39.9 % (ref 34–46.6)
HGB BLD-MCNC: 13.3 G/DL (ref 12–15.9)
IMM GRANULOCYTES # BLD AUTO: 0.05 10*3/MM3 (ref 0–0.05)
IMM GRANULOCYTES NFR BLD AUTO: 0.4 % (ref 0–0.5)
LYMPHOCYTES # BLD AUTO: 1.07 10*3/MM3 (ref 0.7–3.1)
LYMPHOCYTES NFR BLD AUTO: 8.3 % (ref 19.6–45.3)
MCH RBC QN AUTO: 28.7 PG (ref 26.6–33)
MCHC RBC AUTO-ENTMCNC: 33.3 G/DL (ref 31.5–35.7)
MCV RBC AUTO: 86.2 FL (ref 79–97)
MONOCYTES # BLD AUTO: 0.1 10*3/MM3 (ref 0.1–0.9)
MONOCYTES NFR BLD AUTO: 0.8 % (ref 5–12)
NEUTROPHILS NFR BLD AUTO: 11.63 10*3/MM3 (ref 1.7–7)
NEUTROPHILS NFR BLD AUTO: 90.2 % (ref 42.7–76)
NRBC BLD AUTO-RTO: 0.1 /100 WBC (ref 0–0.2)
PLATELET # BLD AUTO: 275 10*3/MM3 (ref 140–450)
PMV BLD AUTO: 10 FL (ref 6–12)
POTASSIUM SERPL-SCNC: 3.6 MMOL/L (ref 3.5–5.2)
RBC # BLD AUTO: 4.63 10*6/MM3 (ref 3.77–5.28)
SODIUM SERPL-SCNC: 133 MMOL/L (ref 136–145)
WBC NRBC COR # BLD: 12.89 10*3/MM3 (ref 3.4–10.8)

## 2023-02-23 PROCEDURE — 25010000002 FENTANYL CITRATE (PF) 50 MCG/ML SOLUTION: Performed by: NURSE ANESTHETIST, CERTIFIED REGISTERED

## 2023-02-23 PROCEDURE — 88307 TISSUE EXAM BY PATHOLOGIST: CPT | Performed by: STUDENT IN AN ORGANIZED HEALTH CARE EDUCATION/TRAINING PROGRAM

## 2023-02-23 PROCEDURE — 58571 TLH W/T/O 250 G OR LESS: CPT | Performed by: STUDENT IN AN ORGANIZED HEALTH CARE EDUCATION/TRAINING PROGRAM

## 2023-02-23 PROCEDURE — 99024 POSTOP FOLLOW-UP VISIT: CPT | Performed by: STUDENT IN AN ORGANIZED HEALTH CARE EDUCATION/TRAINING PROGRAM

## 2023-02-23 PROCEDURE — 25010000002 MIDAZOLAM PER 1 MG: Performed by: ANESTHESIOLOGY

## 2023-02-23 PROCEDURE — S0260 H&P FOR SURGERY: HCPCS | Performed by: STUDENT IN AN ORGANIZED HEALTH CARE EDUCATION/TRAINING PROGRAM

## 2023-02-23 PROCEDURE — 0UT7FZZ RESECTION OF BILATERAL FALLOPIAN TUBES, VIA NATURAL OR ARTIFICIAL OPENING WITH PERCUTANEOUS ENDOSCOPIC ASSISTANCE: ICD-10-PCS | Performed by: STUDENT IN AN ORGANIZED HEALTH CARE EDUCATION/TRAINING PROGRAM

## 2023-02-23 PROCEDURE — 25010000002 DEXAMETHASONE SODIUM PHOSPHATE 20 MG/5ML SOLUTION: Performed by: NURSE ANESTHETIST, CERTIFIED REGISTERED

## 2023-02-23 PROCEDURE — 25010000002 ONDANSETRON PER 1 MG: Performed by: NURSE ANESTHETIST, CERTIFIED REGISTERED

## 2023-02-23 PROCEDURE — 0UT94ZZ RESECTION OF UTERUS, PERCUTANEOUS ENDOSCOPIC APPROACH: ICD-10-PCS | Performed by: STUDENT IN AN ORGANIZED HEALTH CARE EDUCATION/TRAINING PROGRAM

## 2023-02-23 PROCEDURE — 25010000002 KETOROLAC TROMETHAMINE PER 15 MG: Performed by: OBSTETRICS & GYNECOLOGY

## 2023-02-23 PROCEDURE — 25010000002 HYDROMORPHONE PER 4 MG: Performed by: NURSE ANESTHETIST, CERTIFIED REGISTERED

## 2023-02-23 PROCEDURE — 58571 TLH W/T/O 250 G OR LESS: CPT | Performed by: OBSTETRICS & GYNECOLOGY

## 2023-02-23 PROCEDURE — 25010000002 HYDROMORPHONE 1 MG/ML SOLUTION: Performed by: NURSE ANESTHETIST, CERTIFIED REGISTERED

## 2023-02-23 PROCEDURE — C9290 INJ, BUPIVACAINE LIPOSOME: HCPCS | Performed by: ANESTHESIOLOGY

## 2023-02-23 PROCEDURE — 25010000002 PROPOFOL 10 MG/ML EMULSION: Performed by: NURSE ANESTHETIST, CERTIFIED REGISTERED

## 2023-02-23 PROCEDURE — 80048 BASIC METABOLIC PNL TOTAL CA: CPT | Performed by: STUDENT IN AN ORGANIZED HEALTH CARE EDUCATION/TRAINING PROGRAM

## 2023-02-23 PROCEDURE — 85025 COMPLETE CBC W/AUTO DIFF WBC: CPT | Performed by: STUDENT IN AN ORGANIZED HEALTH CARE EDUCATION/TRAINING PROGRAM

## 2023-02-23 PROCEDURE — 25010000002 DROPERIDOL PER 5 MG: Performed by: NURSE ANESTHETIST, CERTIFIED REGISTERED

## 2023-02-23 PROCEDURE — 25010000002 KETOROLAC TROMETHAMINE PER 15 MG: Performed by: NURSE ANESTHETIST, CERTIFIED REGISTERED

## 2023-02-23 PROCEDURE — 0 BUPIVACAINE LIPOSOME 1.3 % SUSPENSION: Performed by: ANESTHESIOLOGY

## 2023-02-23 PROCEDURE — 25010000002 MIDAZOLAM PER 1 MG: Performed by: NURSE ANESTHETIST, CERTIFIED REGISTERED

## 2023-02-23 PROCEDURE — 25010000002 PHENYLEPHRINE 10 MG/ML SOLUTION: Performed by: NURSE ANESTHETIST, CERTIFIED REGISTERED

## 2023-02-23 PROCEDURE — 25010000002 MAGNESIUM SULFATE PER 500 MG OF MAGNESIUM: Performed by: NURSE ANESTHETIST, CERTIFIED REGISTERED

## 2023-02-23 PROCEDURE — 25010000002 CEFAZOLIN IN DEXTROSE 2-4 GM/100ML-% SOLUTION: Performed by: STUDENT IN AN ORGANIZED HEALTH CARE EDUCATION/TRAINING PROGRAM

## 2023-02-23 DEVICE — ABSORBABLE RELOAD
Type: IMPLANTABLE DEVICE | Site: ABDOMEN | Status: FUNCTIONAL
Brand: V-LOC 180

## 2023-02-23 RX ORDER — NALOXONE HCL 0.4 MG/ML
0.2 VIAL (ML) INJECTION AS NEEDED
Status: DISCONTINUED | OUTPATIENT
Start: 2023-02-23 | End: 2023-02-23 | Stop reason: HOSPADM

## 2023-02-23 RX ORDER — PROMETHAZINE HYDROCHLORIDE 25 MG/1
25 TABLET ORAL ONCE AS NEEDED
Status: DISCONTINUED | OUTPATIENT
Start: 2023-02-23 | End: 2023-02-23 | Stop reason: HOSPADM

## 2023-02-23 RX ORDER — DIPHENHYDRAMINE HYDROCHLORIDE 50 MG/ML
25 INJECTION INTRAMUSCULAR; INTRAVENOUS NIGHTLY PRN
Status: DISCONTINUED | OUTPATIENT
Start: 2023-02-23 | End: 2023-02-24 | Stop reason: HOSPADM

## 2023-02-23 RX ORDER — SODIUM CHLORIDE 0.9 % (FLUSH) 0.9 %
3 SYRINGE (ML) INJECTION EVERY 12 HOURS SCHEDULED
Status: DISCONTINUED | OUTPATIENT
Start: 2023-02-23 | End: 2023-02-23 | Stop reason: HOSPADM

## 2023-02-23 RX ORDER — SODIUM CHLORIDE, SODIUM LACTATE, POTASSIUM CHLORIDE, CALCIUM CHLORIDE 600; 310; 30; 20 MG/100ML; MG/100ML; MG/100ML; MG/100ML
100 INJECTION, SOLUTION INTRAVENOUS CONTINUOUS
Status: DISCONTINUED | OUTPATIENT
Start: 2023-02-23 | End: 2023-02-24 | Stop reason: HOSPADM

## 2023-02-23 RX ORDER — LIDOCAINE HYDROCHLORIDE 10 MG/ML
0.5 INJECTION, SOLUTION EPIDURAL; INFILTRATION; INTRACAUDAL; PERINEURAL ONCE AS NEEDED
Status: DISCONTINUED | OUTPATIENT
Start: 2023-02-23 | End: 2023-02-23 | Stop reason: HOSPADM

## 2023-02-23 RX ORDER — FAMOTIDINE 10 MG/ML
20 INJECTION, SOLUTION INTRAVENOUS ONCE
Status: COMPLETED | OUTPATIENT
Start: 2023-02-23 | End: 2023-02-23

## 2023-02-23 RX ORDER — SODIUM CHLORIDE 0.9 % (FLUSH) 0.9 %
10 SYRINGE (ML) INJECTION AS NEEDED
Status: DISCONTINUED | OUTPATIENT
Start: 2023-02-23 | End: 2023-02-23 | Stop reason: HOSPADM

## 2023-02-23 RX ORDER — SODIUM CHLORIDE 9 MG/ML
40 INJECTION, SOLUTION INTRAVENOUS AS NEEDED
Status: DISCONTINUED | OUTPATIENT
Start: 2023-02-23 | End: 2023-02-23 | Stop reason: HOSPADM

## 2023-02-23 RX ORDER — IBUPROFEN 600 MG/1
600 TABLET ORAL EVERY 6 HOURS
Qty: 60 TABLET | Refills: 1 | Status: SHIPPED | OUTPATIENT
Start: 2023-02-23

## 2023-02-23 RX ORDER — CALCIUM CARBONATE 200(500)MG
2 TABLET,CHEWABLE ORAL 3 TIMES DAILY PRN
Status: DISCONTINUED | OUTPATIENT
Start: 2023-02-23 | End: 2023-02-24 | Stop reason: HOSPADM

## 2023-02-23 RX ORDER — DOCUSATE SODIUM 100 MG/1
100 CAPSULE, LIQUID FILLED ORAL 2 TIMES DAILY PRN
Status: DISCONTINUED | OUTPATIENT
Start: 2023-02-23 | End: 2023-02-24 | Stop reason: HOSPADM

## 2023-02-23 RX ORDER — ROCURONIUM BROMIDE 10 MG/ML
INJECTION, SOLUTION INTRAVENOUS AS NEEDED
Status: DISCONTINUED | OUTPATIENT
Start: 2023-02-23 | End: 2023-02-23 | Stop reason: SURG

## 2023-02-23 RX ORDER — HYDRALAZINE HYDROCHLORIDE 20 MG/ML
5 INJECTION INTRAMUSCULAR; INTRAVENOUS
Status: DISCONTINUED | OUTPATIENT
Start: 2023-02-23 | End: 2023-02-23 | Stop reason: HOSPADM

## 2023-02-23 RX ORDER — KETOROLAC TROMETHAMINE 30 MG/ML
30 INJECTION, SOLUTION INTRAMUSCULAR; INTRAVENOUS EVERY 6 HOURS
Status: DISCONTINUED | OUTPATIENT
Start: 2023-02-23 | End: 2023-02-24 | Stop reason: HOSPADM

## 2023-02-23 RX ORDER — PROMETHAZINE HYDROCHLORIDE 25 MG/1
25 TABLET ORAL EVERY 6 HOURS PRN
Status: DISCONTINUED | OUTPATIENT
Start: 2023-02-23 | End: 2023-02-24 | Stop reason: HOSPADM

## 2023-02-23 RX ORDER — FENTANYL CITRATE 50 UG/ML
25 INJECTION, SOLUTION INTRAMUSCULAR; INTRAVENOUS
Status: DISCONTINUED | OUTPATIENT
Start: 2023-02-23 | End: 2023-02-23 | Stop reason: HOSPADM

## 2023-02-23 RX ORDER — GABAPENTIN 300 MG/1
600 CAPSULE ORAL ONCE
Status: COMPLETED | OUTPATIENT
Start: 2023-02-23 | End: 2023-02-23

## 2023-02-23 RX ORDER — DROPERIDOL 2.5 MG/ML
0.62 INJECTION, SOLUTION INTRAMUSCULAR; INTRAVENOUS
Status: DISCONTINUED | OUTPATIENT
Start: 2023-02-23 | End: 2023-02-23 | Stop reason: HOSPADM

## 2023-02-23 RX ORDER — ONDANSETRON 2 MG/ML
4 INJECTION INTRAMUSCULAR; INTRAVENOUS EVERY 6 HOURS PRN
Status: DISCONTINUED | OUTPATIENT
Start: 2023-02-23 | End: 2023-02-24 | Stop reason: HOSPADM

## 2023-02-23 RX ORDER — HYDROMORPHONE HYDROCHLORIDE 1 MG/ML
0.5 INJECTION, SOLUTION INTRAMUSCULAR; INTRAVENOUS; SUBCUTANEOUS
Status: DISCONTINUED | OUTPATIENT
Start: 2023-02-23 | End: 2023-02-23

## 2023-02-23 RX ORDER — FLUMAZENIL 0.1 MG/ML
0.2 INJECTION INTRAVENOUS AS NEEDED
Status: DISCONTINUED | OUTPATIENT
Start: 2023-02-23 | End: 2023-02-23 | Stop reason: HOSPADM

## 2023-02-23 RX ORDER — DIPHENHYDRAMINE HYDROCHLORIDE 50 MG/ML
12.5 INJECTION INTRAMUSCULAR; INTRAVENOUS
Status: DISCONTINUED | OUTPATIENT
Start: 2023-02-23 | End: 2023-02-23 | Stop reason: HOSPADM

## 2023-02-23 RX ORDER — ACETAMINOPHEN 325 MG/1
650 TABLET ORAL EVERY 6 HOURS
Qty: 60 TABLET | Refills: 1 | Status: SHIPPED | OUTPATIENT
Start: 2023-02-23

## 2023-02-23 RX ORDER — ONDANSETRON 2 MG/ML
4 INJECTION INTRAMUSCULAR; INTRAVENOUS ONCE AS NEEDED
Status: COMPLETED | OUTPATIENT
Start: 2023-02-23 | End: 2023-02-23

## 2023-02-23 RX ORDER — SCOLOPAMINE TRANSDERMAL SYSTEM 1 MG/1
1 PATCH, EXTENDED RELEASE TRANSDERMAL CONTINUOUS
Status: DISCONTINUED | OUTPATIENT
Start: 2023-02-23 | End: 2023-02-24 | Stop reason: HOSPADM

## 2023-02-23 RX ORDER — MIDAZOLAM HYDROCHLORIDE 1 MG/ML
1 INJECTION INTRAMUSCULAR; INTRAVENOUS
Status: COMPLETED | OUTPATIENT
Start: 2023-02-23 | End: 2023-02-23

## 2023-02-23 RX ORDER — PROPOFOL 10 MG/ML
VIAL (ML) INTRAVENOUS AS NEEDED
Status: DISCONTINUED | OUTPATIENT
Start: 2023-02-23 | End: 2023-02-23 | Stop reason: SURG

## 2023-02-23 RX ORDER — LIDOCAINE HYDROCHLORIDE 20 MG/ML
INJECTION, SOLUTION INFILTRATION; PERINEURAL AS NEEDED
Status: DISCONTINUED | OUTPATIENT
Start: 2023-02-23 | End: 2023-02-23 | Stop reason: SURG

## 2023-02-23 RX ORDER — DEXAMETHASONE SODIUM PHOSPHATE 4 MG/ML
INJECTION, SOLUTION INTRA-ARTICULAR; INTRALESIONAL; INTRAMUSCULAR; INTRAVENOUS; SOFT TISSUE AS NEEDED
Status: DISCONTINUED | OUTPATIENT
Start: 2023-02-23 | End: 2023-02-23 | Stop reason: SURG

## 2023-02-23 RX ORDER — PHENYLEPHRINE HYDROCHLORIDE 10 MG/ML
INJECTION INTRAVENOUS AS NEEDED
Status: DISCONTINUED | OUTPATIENT
Start: 2023-02-23 | End: 2023-02-23 | Stop reason: SURG

## 2023-02-23 RX ORDER — SODIUM CHLORIDE 0.9 % (FLUSH) 0.9 %
3-10 SYRINGE (ML) INJECTION AS NEEDED
Status: DISCONTINUED | OUTPATIENT
Start: 2023-02-23 | End: 2023-02-23 | Stop reason: HOSPADM

## 2023-02-23 RX ORDER — OXYCODONE AND ACETAMINOPHEN 7.5; 325 MG/1; MG/1
1 TABLET ORAL ONCE AS NEEDED
Status: DISCONTINUED | OUTPATIENT
Start: 2023-02-23 | End: 2023-02-23 | Stop reason: HOSPADM

## 2023-02-23 RX ORDER — LABETALOL HYDROCHLORIDE 5 MG/ML
5 INJECTION, SOLUTION INTRAVENOUS
Status: DISCONTINUED | OUTPATIENT
Start: 2023-02-23 | End: 2023-02-23 | Stop reason: HOSPADM

## 2023-02-23 RX ORDER — FENTANYL CITRATE 50 UG/ML
50 INJECTION, SOLUTION INTRAMUSCULAR; INTRAVENOUS
Status: DISCONTINUED | OUTPATIENT
Start: 2023-02-23 | End: 2023-02-23 | Stop reason: HOSPADM

## 2023-02-23 RX ORDER — MAGNESIUM SULFATE HEPTAHYDRATE 500 MG/ML
INJECTION, SOLUTION INTRAMUSCULAR; INTRAVENOUS AS NEEDED
Status: DISCONTINUED | OUTPATIENT
Start: 2023-02-23 | End: 2023-02-23 | Stop reason: SURG

## 2023-02-23 RX ORDER — FENTANYL CITRATE 50 UG/ML
INJECTION, SOLUTION INTRAMUSCULAR; INTRAVENOUS AS NEEDED
Status: DISCONTINUED | OUTPATIENT
Start: 2023-02-23 | End: 2023-02-23 | Stop reason: SURG

## 2023-02-23 RX ORDER — OXYCODONE HYDROCHLORIDE AND ACETAMINOPHEN 5; 325 MG/1; MG/1
1 TABLET ORAL EVERY 4 HOURS PRN
Status: DISCONTINUED | OUTPATIENT
Start: 2023-02-23 | End: 2023-02-24 | Stop reason: HOSPADM

## 2023-02-23 RX ORDER — OXYCODONE HYDROCHLORIDE 5 MG/1
5-10 TABLET ORAL EVERY 4 HOURS PRN
Qty: 15 TABLET | Refills: 0 | Status: SHIPPED | OUTPATIENT
Start: 2023-02-23 | End: 2023-03-30

## 2023-02-23 RX ORDER — ONDANSETRON 2 MG/ML
INJECTION INTRAMUSCULAR; INTRAVENOUS AS NEEDED
Status: DISCONTINUED | OUTPATIENT
Start: 2023-02-23 | End: 2023-02-23 | Stop reason: SURG

## 2023-02-23 RX ORDER — KETAMINE HCL IN NACL, ISO-OSM 100MG/10ML
SYRINGE (ML) INJECTION AS NEEDED
Status: DISCONTINUED | OUTPATIENT
Start: 2023-02-23 | End: 2023-02-23 | Stop reason: SURG

## 2023-02-23 RX ORDER — PHENAZOPYRIDINE HYDROCHLORIDE 200 MG/1
200 TABLET, FILM COATED ORAL ONCE
Status: COMPLETED | OUTPATIENT
Start: 2023-02-23 | End: 2023-02-23

## 2023-02-23 RX ORDER — DIPHENHYDRAMINE HCL 25 MG
25 CAPSULE ORAL NIGHTLY PRN
Status: DISCONTINUED | OUTPATIENT
Start: 2023-02-23 | End: 2023-02-24 | Stop reason: HOSPADM

## 2023-02-23 RX ORDER — NALOXONE HCL 0.4 MG/ML
0.1 VIAL (ML) INJECTION
Status: DISCONTINUED | OUTPATIENT
Start: 2023-02-23 | End: 2023-02-24 | Stop reason: HOSPADM

## 2023-02-23 RX ORDER — MIDAZOLAM HYDROCHLORIDE 1 MG/ML
INJECTION INTRAMUSCULAR; INTRAVENOUS AS NEEDED
Status: DISCONTINUED | OUTPATIENT
Start: 2023-02-23 | End: 2023-02-23 | Stop reason: SURG

## 2023-02-23 RX ORDER — PROMETHAZINE HYDROCHLORIDE 25 MG/1
25 SUPPOSITORY RECTAL ONCE AS NEEDED
Status: DISCONTINUED | OUTPATIENT
Start: 2023-02-23 | End: 2023-02-23 | Stop reason: HOSPADM

## 2023-02-23 RX ORDER — CEFAZOLIN SODIUM 2 G/100ML
2 INJECTION, SOLUTION INTRAVENOUS ONCE
Status: COMPLETED | OUTPATIENT
Start: 2023-02-23 | End: 2023-02-23

## 2023-02-23 RX ORDER — EPHEDRINE SULFATE 50 MG/ML
5 INJECTION, SOLUTION INTRAVENOUS ONCE AS NEEDED
Status: DISCONTINUED | OUTPATIENT
Start: 2023-02-23 | End: 2023-02-23 | Stop reason: HOSPADM

## 2023-02-23 RX ORDER — HYDROMORPHONE HYDROCHLORIDE 1 MG/ML
0.25 INJECTION, SOLUTION INTRAMUSCULAR; INTRAVENOUS; SUBCUTANEOUS
Status: DISCONTINUED | OUTPATIENT
Start: 2023-02-23 | End: 2023-02-23 | Stop reason: HOSPADM

## 2023-02-23 RX ORDER — MAGNESIUM HYDROXIDE 1200 MG/15ML
LIQUID ORAL AS NEEDED
Status: DISCONTINUED | OUTPATIENT
Start: 2023-02-23 | End: 2023-02-23 | Stop reason: HOSPADM

## 2023-02-23 RX ORDER — BUPIVACAINE HYDROCHLORIDE 2.5 MG/ML
INJECTION, SOLUTION EPIDURAL; INFILTRATION; INTRACAUDAL
Status: COMPLETED | OUTPATIENT
Start: 2023-02-23 | End: 2023-02-23

## 2023-02-23 RX ORDER — ACETAMINOPHEN 500 MG
1000 TABLET ORAL ONCE
Status: COMPLETED | OUTPATIENT
Start: 2023-02-23 | End: 2023-02-23

## 2023-02-23 RX ORDER — HYDROCODONE BITARTRATE AND ACETAMINOPHEN 5; 325 MG/1; MG/1
1 TABLET ORAL ONCE AS NEEDED
Status: DISCONTINUED | OUTPATIENT
Start: 2023-02-23 | End: 2023-02-23 | Stop reason: HOSPADM

## 2023-02-23 RX ORDER — SENNA PLUS 8.6 MG/1
1 TABLET ORAL ONCE
Status: DISCONTINUED | OUTPATIENT
Start: 2023-02-23 | End: 2023-02-24 | Stop reason: HOSPADM

## 2023-02-23 RX ORDER — HYDROCODONE BITARTRATE AND ACETAMINOPHEN 7.5; 325 MG/1; MG/1
1 TABLET ORAL EVERY 4 HOURS PRN
Status: DISCONTINUED | OUTPATIENT
Start: 2023-02-23 | End: 2023-02-23 | Stop reason: HOSPADM

## 2023-02-23 RX ORDER — SODIUM CHLORIDE, SODIUM LACTATE, POTASSIUM CHLORIDE, CALCIUM CHLORIDE 600; 310; 30; 20 MG/100ML; MG/100ML; MG/100ML; MG/100ML
9 INJECTION, SOLUTION INTRAVENOUS CONTINUOUS
Status: DISCONTINUED | OUTPATIENT
Start: 2023-02-23 | End: 2023-02-24 | Stop reason: HOSPADM

## 2023-02-23 RX ORDER — KETOROLAC TROMETHAMINE 30 MG/ML
INJECTION, SOLUTION INTRAMUSCULAR; INTRAVENOUS AS NEEDED
Status: DISCONTINUED | OUTPATIENT
Start: 2023-02-23 | End: 2023-02-23 | Stop reason: SURG

## 2023-02-23 RX ORDER — DOCUSATE SODIUM 250 MG
250 CAPSULE ORAL 2 TIMES DAILY PRN
Qty: 60 CAPSULE | Refills: 1 | Status: SHIPPED | OUTPATIENT
Start: 2023-02-23

## 2023-02-23 RX ADMIN — HYDROMORPHONE HYDROCHLORIDE 0.5 MG: 1 INJECTION, SOLUTION INTRAMUSCULAR; INTRAVENOUS; SUBCUTANEOUS at 12:31

## 2023-02-23 RX ADMIN — SODIUM CHLORIDE, POTASSIUM CHLORIDE, SODIUM LACTATE AND CALCIUM CHLORIDE 100 ML/HR: 600; 310; 30; 20 INJECTION, SOLUTION INTRAVENOUS at 21:21

## 2023-02-23 RX ADMIN — HYDROMORPHONE HYDROCHLORIDE 0.25 MG: 1 INJECTION, SOLUTION INTRAMUSCULAR; INTRAVENOUS; SUBCUTANEOUS at 14:16

## 2023-02-23 RX ADMIN — FENTANYL CITRATE 25 MCG: 50 INJECTION, SOLUTION INTRAMUSCULAR; INTRAVENOUS at 13:54

## 2023-02-23 RX ADMIN — ONDANSETRON 4 MG: 2 INJECTION INTRAMUSCULAR; INTRAVENOUS at 12:23

## 2023-02-23 RX ADMIN — PROPOFOL 150 MG: 10 INJECTION, EMULSION INTRAVENOUS at 11:03

## 2023-02-23 RX ADMIN — KETOROLAC TROMETHAMINE 30 MG: 30 INJECTION, SOLUTION INTRAMUSCULAR at 21:25

## 2023-02-23 RX ADMIN — CEFAZOLIN SODIUM 2 G: 2 INJECTION, SOLUTION INTRAVENOUS at 10:37

## 2023-02-23 RX ADMIN — Medication 10 MG: at 12:10

## 2023-02-23 RX ADMIN — ANTACID TABLETS 2 TABLET: 500 TABLET, CHEWABLE ORAL at 21:22

## 2023-02-23 RX ADMIN — MAGNESIUM SULFATE HEPTAHYDRATE 2 G: 500 INJECTION, SOLUTION INTRAMUSCULAR; INTRAVENOUS at 10:50

## 2023-02-23 RX ADMIN — HYDROMORPHONE HYDROCHLORIDE 0.25 MG: 1 INJECTION, SOLUTION INTRAMUSCULAR; INTRAVENOUS; SUBCUTANEOUS at 14:01

## 2023-02-23 RX ADMIN — BUPIVACAINE 10 ML: 13.3 INJECTION, SUSPENSION, LIPOSOMAL INFILTRATION at 11:47

## 2023-02-23 RX ADMIN — HYDROMORPHONE HYDROCHLORIDE 0.25 MG: 1 INJECTION, SOLUTION INTRAMUSCULAR; INTRAVENOUS; SUBCUTANEOUS at 14:06

## 2023-02-23 RX ADMIN — FENTANYL CITRATE 50 MCG: 50 INJECTION, SOLUTION INTRAMUSCULAR; INTRAVENOUS at 10:50

## 2023-02-23 RX ADMIN — KETOROLAC TROMETHAMINE 30 MG: 30 INJECTION, SOLUTION INTRAMUSCULAR at 12:54

## 2023-02-23 RX ADMIN — FENTANYL CITRATE 25 MCG: 50 INJECTION, SOLUTION INTRAMUSCULAR; INTRAVENOUS at 13:49

## 2023-02-23 RX ADMIN — HYDROMORPHONE HYDROCHLORIDE 0.25 MG: 1 INJECTION, SOLUTION INTRAMUSCULAR; INTRAVENOUS; SUBCUTANEOUS at 13:51

## 2023-02-23 RX ADMIN — DEXAMETHASONE SODIUM PHOSPHATE 8 MG: 4 INJECTION, SOLUTION INTRAMUSCULAR; INTRAVENOUS at 11:19

## 2023-02-23 RX ADMIN — HYDROMORPHONE HYDROCHLORIDE 0.5 MG: 1 INJECTION, SOLUTION INTRAMUSCULAR; INTRAVENOUS; SUBCUTANEOUS at 12:41

## 2023-02-23 RX ADMIN — ROCURONIUM BROMIDE 50 MG: 10 INJECTION INTRAVENOUS at 11:04

## 2023-02-23 RX ADMIN — HYDROMORPHONE HYDROCHLORIDE 0.25 MG: 1 INJECTION, SOLUTION INTRAMUSCULAR; INTRAVENOUS; SUBCUTANEOUS at 13:56

## 2023-02-23 RX ADMIN — HYDROMORPHONE HYDROCHLORIDE 0.5 MG: 1 INJECTION, SOLUTION INTRAMUSCULAR; INTRAVENOUS; SUBCUTANEOUS at 12:35

## 2023-02-23 RX ADMIN — MIDAZOLAM 1 MG: 1 INJECTION INTRAMUSCULAR; INTRAVENOUS at 09:23

## 2023-02-23 RX ADMIN — Medication 35 MG: at 11:10

## 2023-02-23 RX ADMIN — BUPIVACAINE HYDROCHLORIDE 10 ML: 2.5 INJECTION, SOLUTION EPIDURAL; INFILTRATION; INTRACAUDAL; PERINEURAL at 11:47

## 2023-02-23 RX ADMIN — LIDOCAINE HYDROCHLORIDE 100 MG: 20 INJECTION, SOLUTION INFILTRATION; PERINEURAL at 11:03

## 2023-02-23 RX ADMIN — MIDAZOLAM 2 MG: 1 INJECTION INTRAMUSCULAR; INTRAVENOUS at 10:50

## 2023-02-23 RX ADMIN — MIDAZOLAM 1 MG: 1 INJECTION INTRAMUSCULAR; INTRAVENOUS at 09:48

## 2023-02-23 RX ADMIN — HYDROMORPHONE HYDROCHLORIDE 0.25 MG: 1 INJECTION, SOLUTION INTRAMUSCULAR; INTRAVENOUS; SUBCUTANEOUS at 14:21

## 2023-02-23 RX ADMIN — OXYCODONE HYDROCHLORIDE AND ACETAMINOPHEN 1 TABLET: 7.5; 325 TABLET ORAL at 13:47

## 2023-02-23 RX ADMIN — PROPOFOL 50 MCG/KG/MIN: 10 INJECTION, EMULSION INTRAVENOUS at 11:33

## 2023-02-23 RX ADMIN — PHENAZOPYRIDINE 200 MG: 200 TABLET ORAL at 09:31

## 2023-02-23 RX ADMIN — DROPERIDOL 0.62 MG: 2.5 INJECTION, SOLUTION INTRAMUSCULAR; INTRAVENOUS at 14:40

## 2023-02-23 RX ADMIN — PHENYLEPHRINE HYDROCHLORIDE 200 MCG: 10 INJECTION, SOLUTION INTRAVENOUS at 12:40

## 2023-02-23 RX ADMIN — SUGAMMADEX 200 MG: 100 INJECTION, SOLUTION INTRAVENOUS at 12:25

## 2023-02-23 RX ADMIN — SODIUM CHLORIDE, POTASSIUM CHLORIDE, SODIUM LACTATE AND CALCIUM CHLORIDE 9 ML/HR: 600; 310; 30; 20 INJECTION, SOLUTION INTRAVENOUS at 10:37

## 2023-02-23 RX ADMIN — FAMOTIDINE 20 MG: 10 INJECTION INTRAVENOUS at 09:23

## 2023-02-23 RX ADMIN — SCOPALAMINE 1 PATCH: 1 PATCH, EXTENDED RELEASE TRANSDERMAL at 09:46

## 2023-02-23 RX ADMIN — GABAPENTIN 600 MG: 300 CAPSULE ORAL at 09:32

## 2023-02-23 RX ADMIN — ONDANSETRON 4 MG: 2 INJECTION INTRAMUSCULAR; INTRAVENOUS at 14:33

## 2023-02-23 RX ADMIN — ACETAMINOPHEN 1000 MG: 500 TABLET, FILM COATED ORAL at 09:31

## 2023-02-23 NOTE — ANESTHESIA POSTPROCEDURE EVALUATION
Patient: Jeanie Elam    Procedure Summary     Date: 02/23/23 Room / Location: [facility] OR 02 / [facility] MAIN OR    Anesthesia Start: 1043 Anesthesia Stop: 1259    Procedure: TOTAL LAPAROSCOPIC HYSTERECTOMY, BILATERAL SALPINGECTOMY, and cystoscopy (Bilateral: Abdomen) Diagnosis:       Abnormal uterine bleeding (AUB)      Uterine leiomyoma, unspecified location      (Abnormal uterine bleeding (AUB) [N93.9])      (Uterine leiomyoma, unspecified location [D25.9])    Surgeons: Gina Masterson MD Provider: Leona Matos MD    Anesthesia Type: general ASA Status: 2          Anesthesia Type: general    Vitals  Vitals Value Taken Time   /70 02/23/23 1401   Temp 36.4 °C (97.5 °F) 02/23/23 1250   Pulse 74 02/23/23 1408   Resp 16 02/23/23 1400   SpO2 92 % 02/23/23 1408   Vitals shown include unvalidated device data.        Post Anesthesia Care and Evaluation    Pain management: adequate    Airway patency: patent  Anesthetic complications: No anesthetic complications    Cardiovascular status: acceptable  Respiratory status: acceptable  Hydration status: acceptable    Comments: /70   Pulse 90   Temp 36.4 °C (97.5 °F) (Oral)   Resp 16   LMP 02/01/2023   SpO2 96%

## 2023-02-23 NOTE — ANESTHESIA PREPROCEDURE EVALUATION
Anesthesia Evaluation     no history of anesthetic complications:               Airway   Mallampati: I  TM distance: >3 FB  Neck ROM: full  Dental - normal exam     Pulmonary    (-) shortness of breath, recent URI  Cardiovascular     (+) hyperlipidemia,   (-) dysrhythmias, angina      Neuro/Psych  GI/Hepatic/Renal/Endo    (+)  GERD, PUD,    (-) liver disease, no renal disease, diabetes    Musculoskeletal     Abdominal    Substance History      OB/GYN          Other                        Anesthesia Plan    ASA 2     general     intravenous induction     Anesthetic plan, risks, benefits, and alternatives have been provided, discussed and informed consent has been obtained with: patient.        CODE STATUS:

## 2023-02-23 NOTE — ANESTHESIA PROCEDURE NOTES
Airway  Urgency: elective    Date/Time: 2/23/2023 11:06 AM  Airway not difficult    General Information and Staff    Patient location during procedure: OR  Anesthesiologist: Leona Matos MD  CRNA/CAA: Deborah Estrada CRNA    Indications and Patient Condition  Indications for airway management: airway protection    Preoxygenated: yes  Mask difficulty assessment: 1 - vent by mask    Final Airway Details  Final airway type: endotracheal airway      Successful airway: ETT  Cuffed: yes   Successful intubation technique: direct laryngoscopy  Facilitating devices/methods: intubating stylet and cricoid pressure  Endotracheal tube insertion site: oral  Blade: Reyes  Blade size: 2  ETT size (mm): 7.0  Cormack-Lehane Classification: grade I - full view of glottis  Placement verified by: chest auscultation and capnometry   Cuff volume (mL): 3  Measured from: lips  ETT/EBT  to lips (cm): 21  Number of attempts at approach: 1  Assessment: lips, teeth, and gum same as pre-op    Additional Comments  EBBS: ETCO2+: Atraumatic intubation p

## 2023-02-23 NOTE — ANESTHESIA PROCEDURE NOTES
Peripheral Block      Patient location during procedure: OR  Reason for block: at surgeon's request and post-op pain management  Performed by  Anesthesiologist: Leona Matos MD  Preanesthetic Checklist  Completed: patient identified, IV checked, site marked, risks and benefits discussed, surgical consent, monitors and equipment checked, pre-op evaluation and timeout performed  Prep:  Pt Position: supine  Sterile barriers:cap, gloves and mask  Prep: ChloraPrep  Patient monitoring: blood pressure monitoring, continuous pulse oximetry and EKG  Procedure    Sedation: yes  Performed under: general  Guidance:ultrasound guided    Laterality:Bilateral  Block Type:TAP  Injection Technique:single-shot  Needle Type:echogenic  Needle Gauge:21 G      Medications Used: bupivacaine liposome (EXPAREL) 1.3 % injection - Infiltration   10 mL - 2/23/2023 11:47:00 AM  bupivacaine PF (MARCAINE) 0.25 % injection - Injection   10 mL - 2/23/2023 11:47:00 AM      Post Assessment  Injection Assessment: negative aspiration for heme, no paresthesia on injection and incremental injection  Patient Tolerance:comfortable throughout block  Complications:no  Additional Notes  Divided dosing

## 2023-02-24 VITALS
OXYGEN SATURATION: 96 % | TEMPERATURE: 97.8 F | HEART RATE: 81 BPM | RESPIRATION RATE: 16 BRPM | DIASTOLIC BLOOD PRESSURE: 53 MMHG | SYSTOLIC BLOOD PRESSURE: 103 MMHG

## 2023-02-24 LAB
ANION GAP SERPL CALCULATED.3IONS-SCNC: 6.4 MMOL/L (ref 5–15)
BUN SERPL-MCNC: 8 MG/DL (ref 6–20)
BUN/CREAT SERPL: 11.1 (ref 7–25)
CALCIUM SPEC-SCNC: 8.6 MG/DL (ref 8.6–10.5)
CHLORIDE SERPL-SCNC: 107 MMOL/L (ref 98–107)
CO2 SERPL-SCNC: 22.6 MMOL/L (ref 22–29)
CREAT SERPL-MCNC: 0.72 MG/DL (ref 0.57–1)
DEPRECATED RDW RBC AUTO: 38.7 FL (ref 37–54)
EGFRCR SERPLBLD CKD-EPI 2021: 102.6 ML/MIN/1.73
ERYTHROCYTE [DISTWIDTH] IN BLOOD BY AUTOMATED COUNT: 12.8 % (ref 12.3–15.4)
GLUCOSE SERPL-MCNC: 122 MG/DL (ref 65–99)
HCT VFR BLD AUTO: 34.7 % (ref 34–46.6)
HGB BLD-MCNC: 11.8 G/DL (ref 12–15.9)
LAB AP CASE REPORT: NORMAL
LAB AP CLINICAL INFORMATION: NORMAL
MCH RBC QN AUTO: 28.4 PG (ref 26.6–33)
MCHC RBC AUTO-ENTMCNC: 34 G/DL (ref 31.5–35.7)
MCV RBC AUTO: 83.6 FL (ref 79–97)
PATH REPORT.FINAL DX SPEC: NORMAL
PATH REPORT.GROSS SPEC: NORMAL
PLATELET # BLD AUTO: 282 10*3/MM3 (ref 140–450)
PMV BLD AUTO: 10.5 FL (ref 6–12)
POTASSIUM SERPL-SCNC: 4.8 MMOL/L (ref 3.5–5.2)
RBC # BLD AUTO: 4.15 10*6/MM3 (ref 3.77–5.28)
SODIUM SERPL-SCNC: 136 MMOL/L (ref 136–145)
WBC NRBC COR # BLD: 14.47 10*3/MM3 (ref 3.4–10.8)

## 2023-02-24 PROCEDURE — 80048 BASIC METABOLIC PNL TOTAL CA: CPT | Performed by: OBSTETRICS & GYNECOLOGY

## 2023-02-24 PROCEDURE — 85027 COMPLETE CBC AUTOMATED: CPT | Performed by: OBSTETRICS & GYNECOLOGY

## 2023-02-24 PROCEDURE — 25010000002 KETOROLAC TROMETHAMINE PER 15 MG: Performed by: OBSTETRICS & GYNECOLOGY

## 2023-02-24 RX ADMIN — KETOROLAC TROMETHAMINE 30 MG: 30 INJECTION, SOLUTION INTRAMUSCULAR at 01:57

## 2023-03-30 ENCOUNTER — OFFICE VISIT (OUTPATIENT)
Dept: OBSTETRICS AND GYNECOLOGY | Age: 48
End: 2023-03-30
Payer: MEDICAID

## 2023-03-30 VITALS
SYSTOLIC BLOOD PRESSURE: 122 MMHG | DIASTOLIC BLOOD PRESSURE: 64 MMHG | HEIGHT: 60 IN | WEIGHT: 154.2 LBS | BODY MASS INDEX: 30.27 KG/M2

## 2023-03-30 DIAGNOSIS — R30.0 DYSURIA: ICD-10-CM

## 2023-03-30 DIAGNOSIS — Z90.710 S/P LAPAROSCOPIC HYSTERECTOMY: ICD-10-CM

## 2023-03-30 DIAGNOSIS — Z09 POSTOPERATIVE FOLLOW-UP: Primary | ICD-10-CM

## 2023-03-30 PROBLEM — D21.9 FIBROIDS: Status: RESOLVED | Noted: 2022-12-06 | Resolved: 2023-03-30

## 2023-03-30 PROBLEM — N93.9 ABNORMAL UTERINE BLEEDING (AUB): Status: RESOLVED | Noted: 2022-12-14 | Resolved: 2023-03-30

## 2023-03-30 LAB
BILIRUB BLD-MCNC: NEGATIVE MG/DL
CLARITY, POC: CLEAR
COLOR UR: YELLOW
GLUCOSE UR STRIP-MCNC: NEGATIVE MG/DL
KETONES UR QL: NEGATIVE
LEUKOCYTE EST, POC: NEGATIVE
NITRITE UR-MCNC: NEGATIVE MG/ML
PH UR: 5.5 [PH] (ref 5–8)
PROT UR STRIP-MCNC: NEGATIVE MG/DL
RBC # UR STRIP: ABNORMAL /UL
SP GR UR: 1.03 (ref 1–1.03)
UROBILINOGEN UR QL: ABNORMAL

## 2023-03-30 RX ORDER — AMOXICILLIN AND CLAVULANATE POTASSIUM 875; 125 MG/1; MG/1
1 TABLET, FILM COATED ORAL EVERY 12 HOURS SCHEDULED
COMMUNITY
Start: 2023-03-25

## 2023-03-30 NOTE — PROGRESS NOTES
[facility]   Obstetrics and Gynecology     3/30/2023      Patient:  Jeanie Macdonald   MR#:[id number]    Office note    Chief Complaint   Patient presents with   • Post-op Follow-up     Post op 5 wks Hysterectomy 2023, c/o pain w/urination       Subjective     History of Present Illness  48 y.o. female  status post TLH-BS with cystoscopy on 2023.  Patient is doing well.  Pain and bleeding have resolved.  She does have some irritation with urination though.  Occurs every 2-3 days.  Denies hematuria.  Tolerating regular diet.  Defecating without issue.      Relevant data reviewed:  Tissue Pathology Exam (2023 12:18) benign path, fibroids  POC Urinalysis Dipstick (2023 10:11)     #[id number] and [id number]    Patient Active Problem List   Diagnosis   • History of Helicobacter pylori infection   • Internal hemorrhoids   • Colon polyps       Past Medical History:   Diagnosis Date   • Arthritis    • GERD (gastroesophageal reflux disease)    • H pylori ulcer    • Hyperlipidemia      Past Surgical History:   Procedure Laterality Date   • COLONOSCOPY N/A 10/6/2022    Procedure: COLONOSCOPY TO CECUM WITH POLYPECTOMY ( COLD BX);  Surgeon: Jose Pascual MD;  Location: [facility] ENDOSCOPY;  Service: General;  Laterality: N/A;  SCREENING  POST:COLON POLYP; HEMORRHOIDS   • ENDOSCOPY N/A 3/22/2021    Procedure: ESOPHAGOGASTRODUODENOSCOPY WITH COLD BX'S;  Surgeon: Joseph Holden MD;  Location: [facility] ENDOSCOPY;  Service: Gastroenterology;  Laterality: N/A;  pre: EPIGASTRIC PAIN, HX H. PYLORI  post: NORMAL   • ENDOSCOPY N/A 10/6/2022    Procedure: ESOPHAGOGASTRODUODENOSCOPY WITH BIOPSY;  Surgeon: Jose Pascual MD;  Location: [facility] ENDOSCOPY;  Service: General;  Laterality: N/A;  EPIGASTRIC PAIN  POST:HIATAL HERNIA   • I & D / EXCISION MARSUPIALIZATION BARTHOLIN'S GLAND CYST / LYSIS LESIONS     • TOTAL LAPAROSCOPIC HYSTERECTOMY SALPINGO OOPHORECTOMY  Bilateral 2023    Procedure: TOTAL LAPAROSCOPIC HYSTERECTOMY, BILATERAL SALPINGECTOMY, and cystoscopy;  Surgeon: Gina Masterson MD;  Location: [facility];  Service: Obstetrics/Gynecology;  Laterality: Bilateral;     Obstetric History:  OB History        1    Para   1    Term   0       1    AB        Living   1       SAB        IAB        Ectopic        Molar        Multiple        Live Births   1               Menstrual History:     No LMP recorded. Patient has had a hysterectomy.       # 1 - Date: , Sex: Male, Weight: 907 g (2 lb), GA: None, Delivery: Vaginal, Spontaneous, Apgar1: None, Apgar5: None, Living: Living, Birth Comments: None    Family History   Problem Relation Age of Onset   • Arthritis Mother    • Hypertension Mother    • Throat cancer Father    • Lung cancer Sister    • Colon cancer Other    • Breast cancer Neg Hx    • Uterine cancer Neg Hx    • Ovarian cancer Neg Hx    • Malig Hyperthermia Neg Hx      Social History     Tobacco Use   • Smoking status: Never     Passive exposure: Never   • Smokeless tobacco: Never   Vaping Use   • Vaping Use: Never used   Substance Use Topics   • Alcohol use: Not Currently   • Drug use: Never     Patient has no known allergies.    Current Outpatient Medications:   •  acetaminophen (TYLENOL) 325 MG tablet, Take 2 tablets by mouth Every 6 (Six) Hours., Disp: 60 tablet, Rfl: 1  •  amoxicillin-clavulanate (AUGMENTIN) 875-125 MG per tablet, Take 1 tablet by mouth Every 12 (Twelve) Hours., Disp: , Rfl:   •  docusate sodium (COLACE) 250 MG capsule, Take 1 capsule by mouth 2 (Two) Times a Day As Needed for Constipation., Disp: 60 capsule, Rfl: 1  •  ergocalciferol (ERGOCALCIFEROL) 1.25 MG (53825 UT) capsule, Take 1 capsule by mouth Every 7 (Seven) Days., Disp: , Rfl:   •  ibuprofen (ADVIL,MOTRIN) 600 MG tablet, Take 1 tablet by mouth Every 6 (Six) Hours., Disp: 60 tablet, Rfl: 1  •  omeprazole (priLOSEC) 40 MG capsule, Take 1 capsule by  "mouth Every Evening., Disp: , Rfl:   •  Stahist AD 25-60 MG tablet, Take 1 tablet by mouth Every 8 (Eight) Hours As Needed., Disp: , Rfl:     The following portions of the patient's history were reviewed and updated as appropriate: allergies, current medications, past family history, past medical history, past social history, past surgical history and problem list.    Review of Systems   All other systems reviewed and are negative.      BP Readings from Last 3 Encounters:   03/30/23 122/64   02/24/23 103/53   02/21/23 139/85      Wt Readings from Last 3 Encounters:   03/30/23 69.9 kg (154 lb 3.2 oz)   02/21/23 70.2 kg (154 lb 12.8 oz)   12/14/22 68.9 kg (151 lb 12.8 oz)      BMI: Estimated body mass index is 30.12 kg/m² as calculated from the following:    Height as of this encounter: 152.4 cm (60\").    Weight as of this encounter: 69.9 kg (154 lb 3.2 oz). BSA: Estimated body surface area is 1.67 meters squared as calculated from the following:    Height as of this encounter: 152.4 cm (60\").    Weight as of this encounter: 69.9 kg (154 lb 3.2 oz).    Objective   Physical Exam  Vitals and nursing note reviewed.   Constitutional:       General: She is not in acute distress.     Appearance: Normal appearance.   Pulmonary:      Effort: Pulmonary effort is normal. No respiratory distress.   Abdominal:      General: There is no distension.      Palpations: Abdomen is soft.      Tenderness: There is no abdominal tenderness.      Comments: Incisions c/d/i   Genitourinary:     General: Normal vulva.      Vagina: Normal.      Uterus: Absent.       Adnexa: Right adnexa normal and left adnexa normal.      Comments: Vaginal cuff intact on visual inspection and palpation  Neurological:      General: No focal deficit present.      Mental Status: She is alert and oriented to person, place, and time.         Assessment & Plan     Diagnoses and all orders for this visit:    1. Postoperative follow-up (Primary)  -     POC Urinalysis " Dipstick    2. Dysuria  -     POC Urinalysis Dipstick    3. S/P laparoscopic hysterectomy    -Doing great but having intermittent dysuria, UA pos blood only, suspect residual irritation from Sotelo catheter that should resolve with time but encouraged her to use cranberry juice or Azo in the meantime to soothe the urinary tract  - Reviewed benign pathology  -Discussed waiting 1 more week before having any intercourse    Return in about 1 year (around 3/30/2024) for Annual.    Gina Masterson MD   3/30/2023 10:27 EDT

## 2023-04-02 ENCOUNTER — HOSPITAL ENCOUNTER (EMERGENCY)
Facility: HOSPITAL | Age: 48
Discharge: HOME | End: 2023-04-02
Attending: EMERGENCY MEDICINE | Admitting: EMERGENCY MEDICINE
Payer: MEDICAID

## 2023-04-02 ENCOUNTER — APPOINTMENT (OUTPATIENT)
Dept: GENERAL RADIOLOGY | Facility: HOSPITAL | Age: 48
End: 2023-04-02
Payer: MEDICAID

## 2023-04-02 VITALS
TEMPERATURE: 97.7 F | SYSTOLIC BLOOD PRESSURE: 143 MMHG | WEIGHT: 154 LBS | HEART RATE: 83 BPM | RESPIRATION RATE: 16 BRPM | DIASTOLIC BLOOD PRESSURE: 85 MMHG | BODY MASS INDEX: 30.23 KG/M2 | HEIGHT: 60 IN | OXYGEN SATURATION: 99 %

## 2023-04-02 DIAGNOSIS — J02.9 PHARYNGITIS, UNSPECIFIED ETIOLOGY: Primary | ICD-10-CM

## 2023-04-02 DIAGNOSIS — R07.81 PLEURITIC PAIN: ICD-10-CM

## 2023-04-02 LAB
ALBUMIN SERPL-MCNC: 4.2 G/DL (ref 3.5–5.2)
ALBUMIN/GLOB SERPL: 1.4 G/DL
ALP SERPL-CCNC: 78 U/L (ref 39–117)
ALT SERPL W P-5'-P-CCNC: 21 U/L (ref 1–33)
ANION GAP SERPL CALCULATED.3IONS-SCNC: 7.4 MMOL/L (ref 5–15)
AST SERPL-CCNC: 17 U/L (ref 1–32)
B PARAPERT DNA SPEC QL NAA+PROBE: NOT DETECTED
B PERT DNA SPEC QL NAA+PROBE: NOT DETECTED
BASOPHILS # BLD AUTO: 0.04 10*3/MM3 (ref 0–0.2)
BASOPHILS NFR BLD AUTO: 0.5 % (ref 0–1.5)
BILIRUB SERPL-MCNC: <0.2 MG/DL (ref 0–1.2)
BUN SERPL-MCNC: 10 MG/DL (ref 6–20)
BUN/CREAT SERPL: 13.9 (ref 7–25)
C PNEUM DNA NPH QL NAA+NON-PROBE: NOT DETECTED
CALCIUM SPEC-SCNC: 9.6 MG/DL (ref 8.6–10.5)
CHLORIDE SERPL-SCNC: 106 MMOL/L (ref 98–107)
CO2 SERPL-SCNC: 25.6 MMOL/L (ref 22–29)
CREAT SERPL-MCNC: 0.72 MG/DL (ref 0.57–1)
DEPRECATED RDW RBC AUTO: 40.8 FL (ref 37–54)
EGFRCR SERPLBLD CKD-EPI 2021: 102.6 ML/MIN/1.73
EOSINOPHIL # BLD AUTO: 0.12 10*3/MM3 (ref 0–0.4)
EOSINOPHIL NFR BLD AUTO: 1.6 % (ref 0.3–6.2)
ERYTHROCYTE [DISTWIDTH] IN BLOOD BY AUTOMATED COUNT: 13.1 % (ref 12.3–15.4)
FLUAV SUBTYP SPEC NAA+PROBE: NOT DETECTED
FLUBV RNA ISLT QL NAA+PROBE: NOT DETECTED
GLOBULIN UR ELPH-MCNC: 2.9 GM/DL
GLUCOSE SERPL-MCNC: 109 MG/DL (ref 65–99)
HADV DNA SPEC NAA+PROBE: NOT DETECTED
HCOV 229E RNA SPEC QL NAA+PROBE: NOT DETECTED
HCOV HKU1 RNA SPEC QL NAA+PROBE: NOT DETECTED
HCOV NL63 RNA SPEC QL NAA+PROBE: NOT DETECTED
HCOV OC43 RNA SPEC QL NAA+PROBE: NOT DETECTED
HCT VFR BLD AUTO: 39.6 % (ref 34–46.6)
HETEROPH AB SER QL LA: NEGATIVE
HGB BLD-MCNC: 12.9 G/DL (ref 12–15.9)
HMPV RNA NPH QL NAA+NON-PROBE: NOT DETECTED
HPIV1 RNA ISLT QL NAA+PROBE: NOT DETECTED
HPIV2 RNA SPEC QL NAA+PROBE: NOT DETECTED
HPIV3 RNA NPH QL NAA+PROBE: NOT DETECTED
HPIV4 P GENE NPH QL NAA+PROBE: NOT DETECTED
IMM GRANULOCYTES # BLD AUTO: 0.02 10*3/MM3 (ref 0–0.05)
IMM GRANULOCYTES NFR BLD AUTO: 0.3 % (ref 0–0.5)
LYMPHOCYTES # BLD AUTO: 2.32 10*3/MM3 (ref 0.7–3.1)
LYMPHOCYTES NFR BLD AUTO: 30.6 % (ref 19.6–45.3)
M PNEUMO IGG SER IA-ACNC: NOT DETECTED
MCH RBC QN AUTO: 28.2 PG (ref 26.6–33)
MCHC RBC AUTO-ENTMCNC: 32.6 G/DL (ref 31.5–35.7)
MCV RBC AUTO: 86.5 FL (ref 79–97)
MONOCYTES # BLD AUTO: 0.5 10*3/MM3 (ref 0.1–0.9)
MONOCYTES NFR BLD AUTO: 6.6 % (ref 5–12)
NEUTROPHILS NFR BLD AUTO: 4.58 10*3/MM3 (ref 1.7–7)
NEUTROPHILS NFR BLD AUTO: 60.4 % (ref 42.7–76)
NRBC BLD AUTO-RTO: 0 /100 WBC (ref 0–0.2)
PLATELET # BLD AUTO: 356 10*3/MM3 (ref 140–450)
PMV BLD AUTO: 10.1 FL (ref 6–12)
POTASSIUM SERPL-SCNC: 3.6 MMOL/L (ref 3.5–5.2)
PROT SERPL-MCNC: 7.1 G/DL (ref 6–8.5)
RBC # BLD AUTO: 4.58 10*6/MM3 (ref 3.77–5.28)
RHINOVIRUS RNA SPEC NAA+PROBE: NOT DETECTED
RSV RNA NPH QL NAA+NON-PROBE: NOT DETECTED
S PYO AG THROAT QL: NEGATIVE
SARS-COV-2 RNA NPH QL NAA+NON-PROBE: NOT DETECTED
SODIUM SERPL-SCNC: 139 MMOL/L (ref 136–145)
WBC NRBC COR # BLD: 7.58 10*3/MM3 (ref 3.4–10.8)

## 2023-04-02 PROCEDURE — 71045 X-RAY EXAM CHEST 1 VIEW: CPT

## 2023-04-02 PROCEDURE — 85025 COMPLETE CBC W/AUTO DIFF WBC: CPT | Performed by: EMERGENCY MEDICINE

## 2023-04-02 PROCEDURE — 86308 HETEROPHILE ANTIBODY SCREEN: CPT | Performed by: EMERGENCY MEDICINE

## 2023-04-02 PROCEDURE — 80053 COMPREHEN METABOLIC PANEL: CPT | Performed by: EMERGENCY MEDICINE

## 2023-04-02 PROCEDURE — 87880 STREP A ASSAY W/OPTIC: CPT | Performed by: EMERGENCY MEDICINE

## 2023-04-02 PROCEDURE — 0202U NFCT DS 22 TRGT SARS-COV-2: CPT | Performed by: EMERGENCY MEDICINE

## 2023-04-02 PROCEDURE — 99283 EMERGENCY DEPT VISIT LOW MDM: CPT

## 2023-04-02 PROCEDURE — 87081 CULTURE SCREEN ONLY: CPT | Performed by: EMERGENCY MEDICINE

## 2023-04-02 RX ORDER — METHYLPREDNISOLONE 4 MG/1
TABLET ORAL
Qty: 21 TABLET | Refills: 0 | Status: SHIPPED | OUTPATIENT
Start: 2023-04-02

## 2023-04-02 RX ORDER — SODIUM CHLORIDE 0.9 % (FLUSH) 0.9 %
10 SYRINGE (ML) INJECTION AS NEEDED
Status: DISCONTINUED | OUTPATIENT
Start: 2023-04-02 | End: 2023-04-02 | Stop reason: HOSPADM

## 2023-04-02 RX ADMIN — SODIUM CHLORIDE, POTASSIUM CHLORIDE, SODIUM LACTATE AND CALCIUM CHLORIDE 500 ML: 600; 310; 30; 20 INJECTION, SOLUTION INTRAVENOUS at 19:31

## 2023-04-02 NOTE — ED PROVIDER NOTES
" EMERGENCY DEPARTMENT ENCOUNTER    Room Number:  17/17  Date seen:  4/2/2023  PCP: David Garcia APRN  Historian: Patient      HPI:  Chief Complaint: Sore throat, body aches  A complete HPI/ROS/PMH/PSH/SH/FH are unobtainable / limited due to: N/A  Context: Jeanie Macdonald is a 48 y.o. female who presents to the ED for evaluation of persistent sore throat for the past 4 to 5 weeks now.  Patient had a fever for 1 day but no persistent fevers.  She denies any cough.  She denies any shortness of breath.  She complains of some pain in the upper back and also says that her \"lungs hurt.\"  She has been taking ibuprofen at times and sometimes taking naproxen for the pain as well.  She says the pain is relieved temporarily but always returns afterward.  Apparently she just finished a course of amoxicillin 1 week ago.  That was a 14-day course twice daily that was prescribed to her.  She said she did not feel any better after completing the antibiotics.  There have been no known sick contacts at home.  She denies any vomiting or diarrhea or dysuria symptoms.  She denies any abdominal pain.  Incidentally, she did have a hysterectomy on February 24 for abnormal uterine bleeding at this hospital.  She says she has been recovering well from that procedure.        PAST MEDICAL HISTORY  Active Ambulatory Problems     Diagnosis Date Noted   • History of Helicobacter pylori infection 03/08/2021   • Internal hemorrhoids 10/06/2022   • Colon polyps 10/06/2022     Resolved Ambulatory Problems     Diagnosis Date Noted   • Epigastric pain 03/08/2021   • Nausea 03/08/2021   • Absence of bladder continence 12/06/2022   • Fibroids 12/06/2022   • Abnormal uterine bleeding (AUB) 12/14/2022   • S/P laparoscopic hysterectomy 02/23/2023     Past Medical History:   Diagnosis Date   • Arthritis    • GERD (gastroesophageal reflux disease)    • H pylori ulcer    • Hyperlipidemia          PAST SURGICAL HISTORY  Past Surgical History: "   Procedure Laterality Date   • COLONOSCOPY N/A 10/6/2022    Procedure: COLONOSCOPY TO CECUM WITH POLYPECTOMY ( COLD BX);  Surgeon: Jose Pascual MD;  Location:  [facility];  Service: General;  Laterality: N/A;  SCREENING  POST:COLON POLYP; HEMORRHOIDS   • ENDOSCOPY N/A 3/22/2021    Procedure: ESOPHAGOGASTRODUODENOSCOPY WITH COLD BX'S;  Surgeon: Joseph Holden MD;  [facility];  Service: Gastroenterology;  Laterality: N/A;  pre: EPIGASTRIC PAIN, HX H. PYLORI  post: NORMAL   • ENDOSCOPY N/A 10/6/2022    Procedure: ESOPHAGOGASTRODUODENOSCOPY WITH BIOPSY;  Surgeon: Jose Pascual MD;  [facility];  Service: General;  Laterality: N/A;  EPIGASTRIC PAIN  POST:HIATAL HERNIA   • I & D / EXCISION MARSUPIALIZATION BARTHOLIN'S GLAND CYST / LYSIS LESIONS     • TOTAL LAPAROSCOPIC HYSTERECTOMY SALPINGO OOPHORECTOMY Bilateral 2/23/2023    Procedure: TOTAL LAPAROSCOPIC HYSTERECTOMY, BILATERAL SALPINGECTOMY, and cystoscopy;  Surgeon: Gina Masterson MD;  Location: [facility] MAIN OR;  Service: Obstetrics/Gynecology;  Laterality: Bilateral;         FAMILY HISTORY  Family History   Problem Relation Age of Onset   • Arthritis Mother    • Hypertension Mother    • Throat cancer Father    • Lung cancer Sister    • Colon cancer Other    • Breast cancer Neg Hx    • Uterine cancer Neg Hx    • Ovarian cancer Neg Hx    • Malig Hyperthermia Neg Hx          SOCIAL HISTORY  Social History     Socioeconomic History   • Marital status: Single   Tobacco Use   • Smoking status: Never     Passive exposure: Never   • Smokeless tobacco: Never   Vaping Use   • Vaping Use: Never used   Substance and Sexual Activity   • Alcohol use: Not Currently   • Drug use: Never   • Sexual activity: Not Currently     Partners: Male     Birth control/protection: None, Hysterectomy         ALLERGIES  Patient has no known allergies.        REVIEW OF SYSTEMS  Review of Systems   Constitutional: Positive for  fatigue. Negative for activity change and fever.   HENT: Positive for sore throat.    Eyes: Negative for pain and visual disturbance.   Respiratory: Negative for cough and shortness of breath.    Cardiovascular: Negative for chest pain.   Gastrointestinal: Negative for abdominal pain.   Genitourinary: Negative for dysuria.   Musculoskeletal: Positive for back pain and myalgias.   Skin: Negative for color change and wound.   Neurological: Positive for headaches. Negative for syncope.   All other systems reviewed and are negative.           PHYSICAL EXAM  ED Triage Vitals   Temp Heart Rate Resp BP SpO2   04/02/23 1844 04/02/23 1844 04/02/23 1844 04/02/23 1852 04/02/23 1844   97.7 °F (36.5 °C) 95 16 137/83 100 %      Temp src Heart Rate Source Patient Position BP Location FiO2 (%)   04/02/23 1844 04/02/23 1844 -- -- --   Tympanic Monitor          Physical Exam      GENERAL: Pleasant lady, no diaphoresis, no acute distress  HENT: nares patent, normocephalic and atraumatic, moist mucous membranes.  Posterior pharynx is mildly inflamed but uvula is midline and there are no obvious exudative changes present.  Bilateral tympanic membranes are normal.  EYES: no scleral icterus, EOMI, normal conjunctiva  CV: regular rhythm, normal rate, normal distal pulses  RESPIRATORY: normal effort, no stridor, lungs clear to auscultation bilaterally  ABDOMEN: soft, nontender in all quadrants  MUSCULOSKELETAL: no deformity, no asymmetry  NEURO: alert, moves all extremities, follows commands  PSYCH:  calm, cooperative  SKIN: warm, dry    Vital signs and nursing notes reviewed.          LAB RESULTS  Recent Results (from the past 24 hour(s))   Respiratory Panel PCR w/COVID-19(SARS-CoV-2) [facility] In-House, NP Swab in UTM/VTM, 3-4 HR TAT - Swab, Nasopharynx    Collection Time: 04/02/23  7:23 PM    Specimen: Nasopharynx; Swab   Result Value Ref Range    ADENOVIRUS, PCR Not Detected Not Detected    Coronavirus 229E Not  Detected Not Detected    Coronavirus HKU1 Not Detected Not Detected    Coronavirus NL63 Not Detected Not Detected    Coronavirus OC43 Not Detected Not Detected    COVID19 Not Detected Not Detected - Ref. Range    Human Metapneumovirus Not Detected Not Detected    Human Rhinovirus/Enterovirus Not Detected Not Detected    Influenza A PCR Not Detected Not Detected    Influenza B PCR Not Detected Not Detected    Parainfluenza Virus 1 Not Detected Not Detected    Parainfluenza Virus 2 Not Detected Not Detected    Parainfluenza Virus 3 Not Detected Not Detected    Parainfluenza Virus 4 Not Detected Not Detected    RSV, PCR Not Detected Not Detected    Bordetella pertussis pcr Not Detected Not Detected    Bordetella parapertussis PCR Not Detected Not Detected    Chlamydophila pneumoniae PCR Not Detected Not Detected    Mycoplasma pneumo by PCR Not Detected Not Detected   Comprehensive Metabolic Panel    Collection Time: 04/02/23  7:28 PM    Specimen: Blood   Result Value Ref Range    Glucose 109 (H) 65 - 99 mg/dL    BUN 10 6 - 20 mg/dL    Creatinine 0.72 0.57 - 1.00 mg/dL    Sodium 139 136 - 145 mmol/L    Potassium 3.6 3.5 - 5.2 mmol/L    Chloride 106 98 - 107 mmol/L    CO2 25.6 22.0 - 29.0 mmol/L    Calcium 9.6 8.6 - 10.5 mg/dL    Total Protein 7.1 6.0 - 8.5 g/dL    Albumin 4.2 3.5 - 5.2 g/dL    ALT (SGPT) 21 1 - 33 U/L    AST (SGOT) 17 1 - 32 U/L    Alkaline Phosphatase 78 39 - 117 U/L    Total Bilirubin <0.2 0.0 - 1.2 mg/dL    Globulin 2.9 gm/dL    A/G Ratio 1.4 g/dL    BUN/Creatinine Ratio 13.9 7.0 - 25.0    Anion Gap 7.4 5.0 - 15.0 mmol/L    eGFR 102.6 >60.0 mL/min/1.73   Mononucleosis Screen    Collection Time: 04/02/23  7:28 PM    Specimen: Blood   Result Value Ref Range    Monospot Negative Negative   CBC Auto Differential    Collection Time: 04/02/23  7:28 PM    Specimen: Blood   Result Value Ref Range    WBC 7.58 3.40 - 10.80 10*3/mm3    RBC 4.58 3.77 - 5.28 10*6/mm3    Hemoglobin 12.9 12.0 - 15.9 g/dL     Hematocrit 39.6 34.0 - 46.6 %    MCV 86.5 79.0 - 97.0 fL    MCH 28.2 26.6 - 33.0 pg    MCHC 32.6 31.5 - 35.7 g/dL    RDW 13.1 12.3 - 15.4 %    RDW-SD 40.8 37.0 - 54.0 fl    MPV 10.1 6.0 - 12.0 fL    Platelets 356 140 - 450 10*3/mm3    Neutrophil % 60.4 42.7 - 76.0 %    Lymphocyte % 30.6 19.6 - 45.3 %    Monocyte % 6.6 5.0 - 12.0 %    Eosinophil % 1.6 0.3 - 6.2 %    Basophil % 0.5 0.0 - 1.5 %    Immature Grans % 0.3 0.0 - 0.5 %    Neutrophils, Absolute 4.58 1.70 - 7.00 10*3/mm3    Lymphocytes, Absolute 2.32 0.70 - 3.10 10*3/mm3    Monocytes, Absolute 0.50 0.10 - 0.90 10*3/mm3    Eosinophils, Absolute 0.12 0.00 - 0.40 10*3/mm3    Basophils, Absolute 0.04 0.00 - 0.20 10*3/mm3    Immature Grans, Absolute 0.02 0.00 - 0.05 10*3/mm3    nRBC 0.0 0.0 - 0.2 /100 WBC   Rapid Strep A Screen - Swab, Throat    Collection Time: 04/02/23  7:30 PM    Specimen: Throat; Swab   Result Value Ref Range    Strep A Ag Negative Negative       Ordered the above labs and reviewed the results.        RADIOLOGY  XR Chest 1 View    Result Date: 4/2/2023  SINGLE VIEW OF THE CHEST  HISTORY: Lung pain  COMPARISON: 03/22/2021  FINDINGS: Heart size is within normal limits. No pneumothorax or pleural effusion is seen. There is some chronic appearing scarring noted at the left lung base. No definite acute infiltrates are seen.      No acute findings.  This report was finalized on 4/2/2023 7:42 PM by Dr. Miya Pearson M.D.        Ordered the above noted radiological studies. Reviewed by me in PACS.            PROCEDURES  Procedures        MEDICATIONS GIVEN IN ER  Medications   sodium chloride 0.9 % flush 10 mL (has no administration in time range)   lactated ringers bolus 500 mL (0 mL Intravenous Stopped 4/2/23 2001)           MEDICAL DECISION MAKING, PROGRESS, and CONSULTS    All labs have been independently reviewed by me.  All radiology studies have been reviewed by me and I have also reviewed the radiology report.   EKG's independently viewed  and interpreted by me.  Discussion below represents my analysis of pertinent findings related to patient's condition, differential diagnosis, treatment plan and final disposition.      Additional sources:  - Discussed/ obtained information from independent historians: None    - External (non-ED) record review: I reviewed the recent discharge summary from February 24, 2023 when patient had a laparoscopic hysterectomy procedure for her abnormal uterine bleeding and uterine leiomyoma.    - Chronic or social conditions impacting care: None        Orders placed during this visit:  Orders Placed This Encounter   Procedures   • Respiratory Panel PCR w/COVID-19(SARS-CoV-2) [facility] In-House, NP Swab in UTM/VTM, 3-4 HR TAT - Swab, Nasopharynx   • Rapid Strep A Screen - Swab, Throat   • Beta Strep Culture, Throat - Swab, Throat   • XR Chest 1 View   • Comprehensive Metabolic Panel   • Mononucleosis Screen   • CBC Auto Differential   • Insert Peripheral IV   • CBC & Differential           Differential diagnosis includes but is not limited to:    Strep pharyngitis, viral illness, mononucleosis, dehydration      Independent interpretation of labs, radiology studies, and discussions with consultants:  ED Course as of 04/02/23 2118   Sun Apr 02, 2023 1954 Overall, patient is nontoxic-appearing.  However she does complain of persistent sore throat despite taking amoxicillin recently.  She has some other constitutional type symptoms that are more consistent with a viral illness.  I will order the respiratory viral panel and also check mono screen and a rapid strep test. [DONATO]   1954 I independently interpreted the chest x-ray and my findings are: No pneumothorax, no infiltrate or effusions [DONATO]   2117 I have reviewed all the test results from today's visit.  Everything looks to be quite negative and reassuring here.  She has a nonspecific pharyngitis.  Presumptively viral in nature.  I think she is also dealing  with some pleuritic pain in the chest wall.  She is certainly safe to discharge home at this point time.  I encouraged follow-up with her PCP and also asked her to consider follow-up with local ENT doctor for further evaluation of her symptoms if they persist.  She agreed to do so.  Resource information provided at discharge. [DONATO]      ED Course User Index  [DONATO] Maninder Smith MD           DIAGNOSIS  Final diagnoses:   Pharyngitis, unspecified etiology   Pleuritic pain         DISPOSITION  Discharge home            Latest Documented Vital Signs:  As of 21:18 EDT  BP- 143/85 HR- 83 Temp- 97.7 °F (36.5 °C) (Tympanic) O2 sat- 99%              --    Please note that portions of this were completed with a voice recognition program.       Note Disclaimer: At [facility], we believe that sharing information builds trust and better relationships. You are receiving this note because you are receiving care at [facility] or recently visited. It is possible you will see health information before a provider has talked with you about it. This kind of information can be easy to misunderstand. To help you fully understand what it means for your health, we urge you to discuss this note with your provider.           Maninder Smith MD  04/02/23 8181

## 2023-04-02 NOTE — ED TRIAGE NOTES
Sore throat x 4 weeks    Patient was placed in face mask during first look triage.  Patient was wearing a face mask throughout encounter.  I wore personal protective equipment throughout the encounter.  Hand hygiene was performed before and after patient encounter.

## 2023-04-03 NOTE — DISCHARGE INSTRUCTIONS
May continue taking Tylenol or ibuprofen as needed for fevers or pain.  Please return to the emergency room for any worsening symptoms or concerns.

## 2023-04-04 LAB — BACTERIA SPEC AEROBE CULT: NORMAL

## 2023-04-26 ENCOUNTER — HOSPITAL ENCOUNTER (EMERGENCY)
Facility: HOSPITAL | Age: 48
Discharge: HOME | End: 2023-04-26
Attending: EMERGENCY MEDICINE
Payer: MEDICAID

## 2023-04-26 VITALS
DIASTOLIC BLOOD PRESSURE: 75 MMHG | RESPIRATION RATE: 18 BRPM | TEMPERATURE: 96.7 F | HEART RATE: 80 BPM | OXYGEN SATURATION: 100 % | SYSTOLIC BLOOD PRESSURE: 121 MMHG

## 2023-04-26 DIAGNOSIS — J32.0 CHRONIC MAXILLARY SINUSITIS: Primary | ICD-10-CM

## 2023-04-26 PROCEDURE — 99282 EMERGENCY DEPT VISIT SF MDM: CPT

## 2023-04-26 RX ORDER — AMOXICILLIN AND CLAVULANATE POTASSIUM 875; 125 MG/1; MG/1
1 TABLET, FILM COATED ORAL 2 TIMES DAILY
Qty: 14 TABLET | Refills: 0 | Status: SHIPPED | OUTPATIENT
Start: 2023-04-26 | End: 2023-05-03

## 2023-04-27 NOTE — ED PROVIDER NOTES
EMERGENCY DEPARTMENT ENCOUNTER    Room Number:  27/27  Date seen:  4/27/2023  PCP: David Garcia APRN      HPI:  Chief Complaint: Facial pain and swelling  A complete HPI/ROS/PMH/PSH/SH/FH are unobtainable due to: None  Context: Jeanie Macdonald is a 48 y.o. female who presents to the ED c/o facial pain and swelling.  She states that she has been having some swelling in the periocular regions for about 2 weeks.  It seems to be more prominent on the right side than the left.  She reports having facial tenderness to the maxilla.  Symptoms seem to be worse in the morning.  She also reports having vague pain in the right jaw.  She also has pain to her right ear.  No fever.  No active nasal congestion.          PAST MEDICAL HISTORY  Active Ambulatory Problems     Diagnosis Date Noted   • History of Helicobacter pylori infection 03/08/2021   • Internal hemorrhoids 10/06/2022   • Colon polyps 10/06/2022     Resolved Ambulatory Problems     Diagnosis Date Noted   • Epigastric pain 03/08/2021   • Nausea 03/08/2021   • Absence of bladder continence 12/06/2022   • Fibroids 12/06/2022   • Abnormal uterine bleeding (AUB) 12/14/2022   • S/P laparoscopic hysterectomy 02/23/2023     Past Medical History:   Diagnosis Date   • Arthritis    • GERD (gastroesophageal reflux disease)    • H pylori ulcer    • Hyperlipidemia          PAST SURGICAL HISTORY  Past Surgical History:   Procedure Laterality Date   • COLONOSCOPY N/A 10/6/2022    Procedure: COLONOSCOPY TO CECUM WITH POLYPECTOMY ( COLD BX);  Surgeon: Jose Pascual MD;  Location: [facility] ENDOSCOPY;  Service: General;  Laterality: N/A;  SCREENING  POST:COLON POLYP; HEMORRHOIDS   • ENDOSCOPY N/A 3/22/2021    Procedure: ESOPHAGOGASTRODUODENOSCOPY WITH COLD BX'S;  Surgeon: Joseph Holden MD;  Location: [facility] ENDOSCOPY;  Service: Gastroenterology;  Laterality: N/A;  pre: EPIGASTRIC PAIN, HX H. PYLORI  post: NORMAL   • ENDOSCOPY N/A 10/6/2022    Procedure:  ESOPHAGOGASTRODUODENOSCOPY WITH BIOPSY;  Surgeon: Jose Pascual MD;  Location: [facility] ENDOSCOPY;  Service: General;  Laterality: N/A;  EPIGASTRIC PAIN  POST:HIATAL HERNIA   • I & D / EXCISION MARSUPIALIZATION BARTHOLIN'S GLAND CYST / LYSIS LESIONS     • TOTAL LAPAROSCOPIC HYSTERECTOMY SALPINGO OOPHORECTOMY Bilateral 2/23/2023    Procedure: TOTAL LAPAROSCOPIC HYSTERECTOMY, BILATERAL SALPINGECTOMY, and cystoscopy;  Surgeon: Gina Masterson MD;  Location: [facility] MAIN OR;  Service: Obstetrics/Gynecology;  Laterality: Bilateral;         FAMILY HISTORY  Family History   Problem Relation Age of Onset   • Arthritis Mother    • Hypertension Mother    • Throat cancer Father    • Lung cancer Sister    • Colon cancer Other    • Breast cancer Neg Hx    • Uterine cancer Neg Hx    • Ovarian cancer Neg Hx    • Malig Hyperthermia Neg Hx          SOCIAL HISTORY  Social History     Socioeconomic History   • Marital status: Single   Tobacco Use   • Smoking status: Never     Passive exposure: Never   • Smokeless tobacco: Never   Vaping Use   • Vaping Use: Never used   Substance and Sexual Activity   • Alcohol use: Not Currently   • Drug use: Never   • Sexual activity: Not Currently     Partners: Male     Birth control/protection: None, Hysterectomy         ALLERGIES  Patient has no known allergies.        REVIEW OF SYSTEMS  Review of Systems     All systems reviewed and negative except for those discussed in HPI.       PHYSICAL EXAM  ED Triage Vitals   Temp Heart Rate Resp BP SpO2   04/26/23 1902 04/26/23 1902 04/26/23 1919 04/26/23 2012 04/26/23 1902   96.7 °F (35.9 °C) 82 18 121/75 100 %      Temp src Heart Rate Source Patient Position BP Location FiO2 (%)   04/26/23 1902 04/26/23 1902 -- 04/26/23 2012 --   Tympanic Monitor  Right arm        Physical Exam      GENERAL: no acute distress  HENT: nares patent, no appreciable swelling to the face, fair dentition, maxillary and frontal sinus tenderness, TMs clear  bilaterally, no mastoid tenderness  EYES: no scleral icterus  CV: regular rhythm, normal rate  RESPIRATORY: normal effort  MUSCULOSKELETAL: no deformity  NEURO: alert, moves all extremities, follows commands  PSYCH:  calm, cooperative  SKIN: warm, dry    Vital signs and nursing notes reviewed.          LAB RESULTS  No results found for this or any previous visit (from the past 24 hour(s)).    Ordered the above labs and reviewed the results.        RADIOLOGY  No Radiology Exams Resulted Within Past 24 Hours    Ordered the above noted radiological studies. Reviewed by me in PACS.          PROCEDURES  Procedures          MEDICATIONS GIVEN IN ER  Medications - No data to display      MEDICAL DECISION MAKING, PROGRESS, and CONSULTS    Discussion below represents my analysis of pertinent findings related to patient's condition, differential diagnosis, treatment plan and final disposition.      Orders placed during this visit:  No orders of the defined types were placed in this encounter.        Additional sources:  - Discussed/obtained information from independent historians:  at bedside  Additional information was obtained to confirm the patient's history.    - External (non-ED) record review: On medical chart review, I reviewed most recent discharge summary from 2/24/2023 by Dr. Pitts, OB.  Patient was admitted for abnormal uterine bleeding.  She has uterine leiomyoma and had a laparoscopic hysterectomy.           Differential diagnosis:    Dental caries, acute otitis media, chronic sinusitis, acute sinusitis          Independent interpretation of labs, radiology studies, and discussions with consultants:         Patient has no evidence of acute otitis media.  Dental hygiene is fair although she does have prior caries.  No evidence of acute infection.  No evidence of ANUG.  EOMI with no diplopia.  At this point, I think patient may have chronic sinusitis.  She has not received antibiotics for this.  I will  empirically treat her with Augmentin given the duration of symptoms for 2 weeks at this point.  She does have a referral to ENT already in process through her PCP.      DIAGNOSIS  Final diagnoses:   Chronic maxillary sinusitis         DISPOSITION  DISCHARGE    FOLLOW-UP  [facility] Emergency Department  [address]  [facility] [address] [phone]  Go to   If symptoms worsen    Radha Mickeybrittney, APRN  [address]  [address]  [facility] [address]  [phone]    Schedule an appointment as soon as possible for a visit in 1 week           Medication List      Changed    amoxicillin-clavulanate 875-125 MG per tablet  Commonly known as: AUGMENTIN  Take 1 tablet by mouth 2 (Two) Times a Day for 7 days.  What changed: when to take this           Where to Get Your Medications      These medications were sent to [id number] - [place], [place] - [address] [facility] AT [address] [facility] - [phone]  - [phone] FX  [address] [address]    Phone: [phone]   · amoxicillin-clavulanate 875-125 MG per tablet             Latest Documented Vital Signs:  As of 00:36 EDT  BP- 121/75 HR- 80 Temp- 96.7 °F (35.9 °C) (Tympanic) O2 sat- 100%      --    Please note that portions of this were completed with a voice recognition program.       Note Disclaimer: At [facility], we believe that sharing information builds trust and better relationships. You are receiving this note because you are receiving care at [facility] or recently visited. It is possible you will see health information before a provider has talked with you about it. This kind of information can be easy to misunderstand. To help you fully understand what it means for your health, we urge you to discuss this note with your provider.       Kulwinder Swanson II, MD  04/27/23 0039

## 2023-08-16 ENCOUNTER — TELEPHONE (OUTPATIENT)
Dept: OBSTETRICS AND GYNECOLOGY | Age: 48
End: 2023-08-16
Payer: MEDICAID

## 2023-08-16 NOTE — TELEPHONE ENCOUNTER
used to relay information to pt. Pt states she will try the hydrocortisone cream & if no improvement she will give our office a call back.

## 2023-08-16 NOTE — TELEPHONE ENCOUNTER
Pt calling stating she went to a water park 2 weeks ago & noticed a few days after that she had a rash in her inner thigh & vaginal area. Pt states rash went away but a few days later came back and now has small bumps as well. Pt states it is very itchy & burns. Pt would like to know if there is anything she can use to try to heal the area or if she needs to make an appt to be seen. Please advise.

## 2023-09-05 ENCOUNTER — OFFICE VISIT (OUTPATIENT)
Dept: OBSTETRICS AND GYNECOLOGY | Age: 48
End: 2023-09-05
Payer: MEDICAID

## 2023-09-05 VITALS
HEIGHT: 60 IN | SYSTOLIC BLOOD PRESSURE: 122 MMHG | BODY MASS INDEX: 29.06 KG/M2 | WEIGHT: 148 LBS | DIASTOLIC BLOOD PRESSURE: 70 MMHG

## 2023-09-05 DIAGNOSIS — S30.824A BLISTER (NONTHERMAL) OF VAGINA AND VULVA, INITIAL ENCOUNTER: Primary | ICD-10-CM

## 2023-09-05 NOTE — PROGRESS NOTES
"Subjective     Chief Complaint   Patient presents with    Gynecologic Exam     Rash in vaginal area, has formed blisters, itching       Jeanie Macdonald is a 48 y.o.  whose LMP is Patient's last menstrual period was 2023.     Pt presents today with chief complaint of rash/blisters in vaginal area  She states this has been going on for a month  Will notice 3-4 blisters that itch, they will pop and will have clear fluid  She states it is not painful but just itchy  She tried OTC cortisone but it did not help  Denies abnormal vaginal discharge  She is SA, no new sexual partners, same partner for 20 years  She states neighbor had viral ointment and that helped  She does frequently switch her detergents/soaps      No Additional Complaints Reported    The following portions of the patient's history were reviewed and updated as appropriate:vital signs, allergies, current medications, past medical history, past social history, past surgical history, and problem list      Review of Systems   Pertinent items are noted in HPI.     Objective      /70   Ht 152.4 cm (60\")   Wt 67.1 kg (148 lb)   LMP 2023   BMI 28.90 kg/m²     Physical Exam    General:   alert and no distress   Heart: Not performed today   Lungs: Not performed today.   Breast: Not performed today   Neck: na   Abdomen: {Not performed today   CVA: Not performed today   Pelvis: External genitalia: normal general appearance and there are 4 resolving possible blisters/lesions noted to groin area and one on right inner thigh. No s/s infection. Does not appear to be herpetic lesion. Dry, almost healed and not able to culture.   Extremities: Not performed today   Neurologic: negative   Psychiatric: Normal affect, judgement, and mood       Lab Review   Labs: No data reviewed     Imaging   No data reviewed    Assessment & Plan     ASSESSMENT  1. Blister (nonthermal) of vagina and vulva, initial encounter        PLAN  1.   Orders Placed This " Encounter   Procedures    HSV 1 & 2 - Specific Antibody, IgG       2. Medications prescribed this encounter:        New Medications Ordered This Visit   Medications    triamcinolone (KENALOG) 0.1 % ointment     Sig: Apply 1 application  topically to the appropriate area as directed 2 (Two) Times a Day As Needed for Rash or Irritation.     Dispense:  30 g     Refill:  0       3. Will call with blood results. Discussed to call when she has new blister and will try to culture for HSV.     Ijeoma Swanson, APRN  9/5/2023

## 2023-09-06 LAB
HSV1 IGG SER IA-ACNC: >62.2 INDEX (ref 0–0.9)
HSV2 IGG SER IA-ACNC: >23.6 INDEX (ref 0–0.9)

## 2023-09-06 RX ORDER — VALACYCLOVIR HYDROCHLORIDE 1 G/1
TABLET, FILM COATED ORAL
Qty: 30 TABLET | Refills: 1 | Status: SHIPPED | OUTPATIENT
Start: 2023-09-06

## 2023-11-17 ENCOUNTER — APPOINTMENT (OUTPATIENT)
Dept: CT IMAGING | Facility: HOSPITAL | Age: 48
End: 2023-11-17
Payer: MEDICAID

## 2023-11-17 ENCOUNTER — HOSPITAL ENCOUNTER (EMERGENCY)
Facility: HOSPITAL | Age: 48
Discharge: HOME | End: 2023-11-17
Attending: EMERGENCY MEDICINE
Payer: MEDICAID

## 2023-11-17 VITALS
DIASTOLIC BLOOD PRESSURE: 69 MMHG | RESPIRATION RATE: 16 BRPM | TEMPERATURE: 98 F | HEART RATE: 91 BPM | SYSTOLIC BLOOD PRESSURE: 118 MMHG | OXYGEN SATURATION: 93 % | BODY MASS INDEX: 27.48 KG/M2 | HEIGHT: 60 IN | WEIGHT: 140 LBS

## 2023-11-17 DIAGNOSIS — R10.9 RIGHT FLANK PAIN: Primary | ICD-10-CM

## 2023-11-17 LAB
ALBUMIN SERPL-MCNC: 4.8 G/DL (ref 3.5–5.2)
ALBUMIN/GLOB SERPL: 1.8 G/DL
ALP SERPL-CCNC: 81 U/L (ref 39–117)
ALT SERPL W P-5'-P-CCNC: 19 U/L (ref 1–33)
ANION GAP SERPL CALCULATED.3IONS-SCNC: 9 MMOL/L (ref 5–15)
AST SERPL-CCNC: 16 U/L (ref 1–32)
BASOPHILS # BLD AUTO: 0.06 10*3/MM3 (ref 0–0.2)
BASOPHILS NFR BLD AUTO: 0.8 % (ref 0–1.5)
BILIRUB SERPL-MCNC: 0.2 MG/DL (ref 0–1.2)
BILIRUB UR QL STRIP: NEGATIVE
BUN SERPL-MCNC: 11 MG/DL (ref 6–20)
BUN/CREAT SERPL: 13.4 (ref 7–25)
CALCIUM SPEC-SCNC: 9.2 MG/DL (ref 8.6–10.5)
CHLORIDE SERPL-SCNC: 103 MMOL/L (ref 98–107)
CLARITY UR: CLEAR
CO2 SERPL-SCNC: 25 MMOL/L (ref 22–29)
COLOR UR: YELLOW
CREAT SERPL-MCNC: 0.82 MG/DL (ref 0.57–1)
DEPRECATED RDW RBC AUTO: 44 FL (ref 37–54)
EGFRCR SERPLBLD CKD-EPI 2021: 87.8 ML/MIN/1.73
EOSINOPHIL # BLD AUTO: 0.1 10*3/MM3 (ref 0–0.4)
EOSINOPHIL NFR BLD AUTO: 1.3 % (ref 0.3–6.2)
ERYTHROCYTE [DISTWIDTH] IN BLOOD BY AUTOMATED COUNT: 13.5 % (ref 12.3–15.4)
GLOBULIN UR ELPH-MCNC: 2.6 GM/DL
GLUCOSE SERPL-MCNC: 98 MG/DL (ref 65–99)
GLUCOSE UR STRIP-MCNC: NEGATIVE MG/DL
HCT VFR BLD AUTO: 40.7 % (ref 34–46.6)
HGB BLD-MCNC: 12.9 G/DL (ref 12–15.9)
HGB UR QL STRIP.AUTO: NEGATIVE
HOLD SPECIMEN: NORMAL
HOLD SPECIMEN: NORMAL
IMM GRANULOCYTES # BLD AUTO: 0.02 10*3/MM3 (ref 0–0.05)
IMM GRANULOCYTES NFR BLD AUTO: 0.3 % (ref 0–0.5)
KETONES UR QL STRIP: ABNORMAL
LEUKOCYTE ESTERASE UR QL STRIP.AUTO: NEGATIVE
LIPASE SERPL-CCNC: 43 U/L (ref 13–60)
LYMPHOCYTES # BLD AUTO: 2.81 10*3/MM3 (ref 0.7–3.1)
LYMPHOCYTES NFR BLD AUTO: 36 % (ref 19.6–45.3)
MCH RBC QN AUTO: 28 PG (ref 26.6–33)
MCHC RBC AUTO-ENTMCNC: 31.7 G/DL (ref 31.5–35.7)
MCV RBC AUTO: 88.5 FL (ref 79–97)
MONOCYTES # BLD AUTO: 0.57 10*3/MM3 (ref 0.1–0.9)
MONOCYTES NFR BLD AUTO: 7.3 % (ref 5–12)
NEUTROPHILS NFR BLD AUTO: 4.25 10*3/MM3 (ref 1.7–7)
NEUTROPHILS NFR BLD AUTO: 54.3 % (ref 42.7–76)
NITRITE UR QL STRIP: NEGATIVE
NRBC BLD AUTO-RTO: 0 /100 WBC (ref 0–0.2)
PH UR STRIP.AUTO: 5.5 [PH] (ref 5–8)
PLATELET # BLD AUTO: 329 10*3/MM3 (ref 140–450)
PMV BLD AUTO: 10.6 FL (ref 6–12)
POTASSIUM SERPL-SCNC: 4.1 MMOL/L (ref 3.5–5.2)
PROT SERPL-MCNC: 7.4 G/DL (ref 6–8.5)
PROT UR QL STRIP: NEGATIVE
RBC # BLD AUTO: 4.6 10*6/MM3 (ref 3.77–5.28)
SODIUM SERPL-SCNC: 137 MMOL/L (ref 136–145)
SP GR UR STRIP: 1.02 (ref 1–1.03)
UROBILINOGEN UR QL STRIP: ABNORMAL
WBC NRBC COR # BLD AUTO: 7.81 10*3/MM3 (ref 3.4–10.8)
WHOLE BLOOD HOLD COAG: NORMAL
WHOLE BLOOD HOLD SPECIMEN: NORMAL

## 2023-11-17 PROCEDURE — 36415 COLL VENOUS BLD VENIPUNCTURE: CPT

## 2023-11-17 PROCEDURE — 25510000001 IOPAMIDOL 61 % SOLUTION: Performed by: EMERGENCY MEDICINE

## 2023-11-17 PROCEDURE — 25010000002 HYDROMORPHONE PER 4 MG: Performed by: EMERGENCY MEDICINE

## 2023-11-17 PROCEDURE — 85025 COMPLETE CBC W/AUTO DIFF WBC: CPT

## 2023-11-17 PROCEDURE — 81003 URINALYSIS AUTO W/O SCOPE: CPT

## 2023-11-17 PROCEDURE — 96374 THER/PROPH/DIAG INJ IV PUSH: CPT

## 2023-11-17 PROCEDURE — 83690 ASSAY OF LIPASE: CPT

## 2023-11-17 PROCEDURE — 80053 COMPREHEN METABOLIC PANEL: CPT

## 2023-11-17 PROCEDURE — 96375 TX/PRO/DX INJ NEW DRUG ADDON: CPT

## 2023-11-17 PROCEDURE — 74177 CT ABD & PELVIS W/CONTRAST: CPT

## 2023-11-17 PROCEDURE — 99285 EMERGENCY DEPT VISIT HI MDM: CPT

## 2023-11-17 PROCEDURE — 25010000002 ONDANSETRON PER 1 MG: Performed by: EMERGENCY MEDICINE

## 2023-11-17 PROCEDURE — 25810000003 SODIUM CHLORIDE 0.9 % SOLUTION: Performed by: EMERGENCY MEDICINE

## 2023-11-17 RX ORDER — SODIUM CHLORIDE 0.9 % (FLUSH) 0.9 %
10 SYRINGE (ML) INJECTION AS NEEDED
Status: DISCONTINUED | OUTPATIENT
Start: 2023-11-17 | End: 2023-11-17 | Stop reason: HOSPADM

## 2023-11-17 RX ORDER — ONDANSETRON 2 MG/ML
4 INJECTION INTRAMUSCULAR; INTRAVENOUS ONCE
Status: COMPLETED | OUTPATIENT
Start: 2023-11-17 | End: 2023-11-17

## 2023-11-17 RX ORDER — ONDANSETRON 4 MG/1
4 TABLET, ORALLY DISINTEGRATING ORAL EVERY 8 HOURS PRN
Qty: 12 TABLET | Refills: 0 | Status: SHIPPED | OUTPATIENT
Start: 2023-11-17

## 2023-11-17 RX ORDER — DICLOFENAC SODIUM 75 MG/1
75 TABLET, DELAYED RELEASE ORAL 2 TIMES DAILY
Qty: 20 TABLET | Refills: 0 | Status: SHIPPED | OUTPATIENT
Start: 2023-11-17

## 2023-11-17 RX ORDER — PANTOPRAZOLE SODIUM 40 MG/1
40 TABLET, DELAYED RELEASE ORAL DAILY
Qty: 30 TABLET | Refills: 0 | Status: SHIPPED | OUTPATIENT
Start: 2023-11-17

## 2023-11-17 RX ORDER — HYDROMORPHONE HYDROCHLORIDE 1 MG/ML
0.5 INJECTION, SOLUTION INTRAMUSCULAR; INTRAVENOUS; SUBCUTANEOUS ONCE
Status: COMPLETED | OUTPATIENT
Start: 2023-11-17 | End: 2023-11-17

## 2023-11-17 RX ADMIN — ONDANSETRON 4 MG: 2 INJECTION INTRAMUSCULAR; INTRAVENOUS at 01:10

## 2023-11-17 RX ADMIN — HYDROMORPHONE HYDROCHLORIDE 0.5 MG: 1 INJECTION, SOLUTION INTRAMUSCULAR; INTRAVENOUS; SUBCUTANEOUS at 01:11

## 2023-11-17 RX ADMIN — SODIUM CHLORIDE 1000 ML: 9 INJECTION, SOLUTION INTRAVENOUS at 01:03

## 2023-11-17 RX ADMIN — IOPAMIDOL 85 ML: 612 INJECTION, SOLUTION INTRAVENOUS at 01:39

## 2023-11-17 NOTE — DISCHARGE INSTRUCTIONS
Home, rest, medicine as directed, keep well-hydrated, clear liquid diet for 24 hours then advance bland diet as tolerated.  Home medicine as prescribed, follow up with PCP for recheck. Return to care with further concerns.

## 2023-11-17 NOTE — ED PROVIDER NOTES
MD ATTESTATION NOTE    The LANDON and I have discussed this patient's history, physical exam, and treatment plan.    I provided a substantive portion of the care of this patient. I personally performed the physical exam, in its entirety. The attached note describes my personal findings.      Jeanie Macdonald is a 48 y.o. female who presents to the ED c/o right flank pain.  Says radiates to her right lower abdomen.  No nausea vomiting.      On exam:  GENERAL: not distressed  HENT: nares patent  EYES: no scleral icterus  CV: regular rhythm, regular rate  RESPIRATORY: normal effort  ABDOMEN: soft, right abdominal tenderness there is no guarding or rebound  MUSCULOSKELETAL: no deformity  NEURO: alert, moves all extremities, follows commands  SKIN: warm, dry    Labs  Recent Results (from the past 24 hour(s))   Comprehensive Metabolic Panel    Collection Time: 11/17/23 12:31 AM    Specimen: Blood   Result Value Ref Range    Glucose 98 65 - 99 mg/dL    BUN 11 6 - 20 mg/dL    Creatinine 0.82 0.57 - 1.00 mg/dL    Sodium 137 136 - 145 mmol/L    Potassium 4.1 3.5 - 5.2 mmol/L    Chloride 103 98 - 107 mmol/L    CO2 25.0 22.0 - 29.0 mmol/L    Calcium 9.2 8.6 - 10.5 mg/dL    Total Protein 7.4 6.0 - 8.5 g/dL    Albumin 4.8 3.5 - 5.2 g/dL    ALT (SGPT) 19 1 - 33 U/L    AST (SGOT) 16 1 - 32 U/L    Alkaline Phosphatase 81 39 - 117 U/L    Total Bilirubin 0.2 0.0 - 1.2 mg/dL    Globulin 2.6 gm/dL    A/G Ratio 1.8 g/dL    BUN/Creatinine Ratio 13.4 7.0 - 25.0    Anion Gap 9.0 5.0 - 15.0 mmol/L    eGFR 87.8 >60.0 mL/min/1.73   Lipase    Collection Time: 11/17/23 12:31 AM    Specimen: Blood   Result Value Ref Range    Lipase 43 13 - 60 U/L   Green Top (Gel)    Collection Time: 11/17/23 12:31 AM   Result Value Ref Range    Extra Tube Hold for add-ons.    Lavender Top    Collection Time: 11/17/23 12:31 AM   Result Value Ref Range    Extra Tube hold for add-on    Gold Top - SST    Collection Time: 11/17/23 12:31 AM   Result Value Ref Range     Extra Tube Hold for add-ons.    Light Blue Top    Collection Time: 11/17/23 12:31 AM   Result Value Ref Range    Extra Tube Hold for add-ons.    CBC Auto Differential    Collection Time: 11/17/23 12:31 AM    Specimen: Blood   Result Value Ref Range    WBC 7.81 3.40 - 10.80 10*3/mm3    RBC 4.60 3.77 - 5.28 10*6/mm3    Hemoglobin 12.9 12.0 - 15.9 g/dL    Hematocrit 40.7 34.0 - 46.6 %    MCV 88.5 79.0 - 97.0 fL    MCH 28.0 26.6 - 33.0 pg    MCHC 31.7 31.5 - 35.7 g/dL    RDW 13.5 12.3 - 15.4 %    RDW-SD 44.0 37.0 - 54.0 fl    MPV 10.6 6.0 - 12.0 fL    Platelets 329 140 - 450 10*3/mm3    Neutrophil % 54.3 42.7 - 76.0 %    Lymphocyte % 36.0 19.6 - 45.3 %    Monocyte % 7.3 5.0 - 12.0 %    Eosinophil % 1.3 0.3 - 6.2 %    Basophil % 0.8 0.0 - 1.5 %    Immature Grans % 0.3 0.0 - 0.5 %    Neutrophils, Absolute 4.25 1.70 - 7.00 10*3/mm3    Lymphocytes, Absolute 2.81 0.70 - 3.10 10*3/mm3    Monocytes, Absolute 0.57 0.10 - 0.90 10*3/mm3    Eosinophils, Absolute 0.10 0.00 - 0.40 10*3/mm3    Basophils, Absolute 0.06 0.00 - 0.20 10*3/mm3    Immature Grans, Absolute 0.02 0.00 - 0.05 10*3/mm3    nRBC 0.0 0.0 - 0.2 /100 WBC   Urinalysis With Microscopic If Indicated (No Culture) - Urine, Clean Catch    Collection Time: 11/17/23 12:45 AM    Specimen: Urine, Clean Catch   Result Value Ref Range    Color, UA Yellow Yellow, Straw    Appearance, UA Clear Clear    pH, UA 5.5 5.0 - 8.0    Specific Gravity, UA 1.020 1.005 - 1.030    Glucose, UA Negative Negative    Ketones, UA Trace (A) Negative    Bilirubin, UA Negative Negative    Blood, UA Negative Negative    Protein, UA Negative Negative    Leuk Esterase, UA Negative Negative    Nitrite, UA Negative Negative    Urobilinogen, UA 0.2 E.U./dL 0.2 - 1.0 E.U./dL       Radiology  CT Abdomen Pelvis With Contrast    Result Date: 11/17/2023  CT OF THE ABDOMEN PELVIS WITH CONTRAST  HISTORY: Right-sided pain  COMPARISON: March 22, 2021  TECHNIQUE: Axial CT imaging was obtained through the abdomen  and pelvis. IV contrast was administered.  FINDINGS: Images through the lung bases are clear. No suspicious hepatic lesions are seen. The gallbladder, adrenal glands, spleen, pancreas, stomach, and duodenum are normal. Kidneys enhance symmetrically. There is no hydronephrosis. The patient has a duplicated collecting system on the left. Urinary bladder is normal. Uterus is absent. There is no bowel obstruction. The appendix is normal. No acute osseous abnormalities are seen.      No acute intra-abdominal or intrapelvic process is seen.  Radiation dose reduction techniques were utilized, including automated exposure control and exposure modulation based on body size.   This report was finalized on 11/17/2023 2:09 AM by Dr. Miya Pearson M.D on Workstation: [id number]       Medications given in the ED:  Medications   sodium chloride 0.9 % bolus 1,000 mL (0 mL Intravenous Stopped 11/17/23 0416)   HYDROmorphone (DILAUDID) injection 0.5 mg (0.5 mg Intravenous Given 11/17/23 0111)   ondansetron (ZOFRAN) injection 4 mg (4 mg Intravenous Given 11/17/23 0110)   iopamidol (ISOVUE-300) 61 % injection 85 mL (85 mL Intravenous Given 11/17/23 0139)       Orders placed during this visit:  Orders Placed This Encounter   Procedures    CT Abdomen Pelvis With Contrast    [facility] Draw    Comprehensive Metabolic Panel    Lipase    Urinalysis With Microscopic If Indicated (No Culture) - Urine, Clean Catch    CBC Auto Differential    Undress & Gown    CBC & Differential    Green Top (Gel)    Lavender Top    Gold Top - SST    Light Blue Top       Medical Decision Making:  ED Course as of 11/17/23 0706   Fri Nov 17, 2023   0054 WBC: 7.81 [TANA]   0054 Hemoglobin: 12.9 [TANA]   0054 Hematocrit: 40.7 [TANA]   0054 Platelets: 329 [TANA]   0153 Glucose: 98 [TANA]   0153 BUN: 11 [TANA]   0153 Creatinine: 0.82 [TANA]   0153 Sodium: 137 [TANA]   0153 Potassium: 4.1 [TANA]   0153 Chloride: 103 [TANA]   0153 CO2: 25.0 [TANA]   0153 Lipase: 43 [TANA]   0153 Leukocytes,  UA: Negative [TANA]   0153 Nitrite, UA: Negative [TANA]   0154 CT images independently viewed by me, I see no evidence for color disease and no obstructing uropathy, normal appendix. [TANA]   0333 Ketones, UA(!): Trace [TANA]   0336 Patient rechecked, resting comfortably in the stretcher, no acute distress.  Discussed results through the use of the  including normal urine, blood work, and CT scan that showed no acute abnormality with the patient.  Discussed plan for discharge with symptomatic treatment and short-term follow-up with PCP.  She expresses understanding and agrees with plan. [TANA]      ED Course User Index  [TANA] Milo Hernandez PA             Diagnosis  Final diagnoses:   Right flank pain          Kulwinder Bui MD  11/17/23 0706

## 2023-11-17 NOTE — ED PROVIDER NOTES
EMERGENCY DEPARTMENT ENCOUNTER    Room Number:  06/06  Date of encounter:  11/17/2023  PCP: David Garcia APRN  Patient Care Team:  David Garcia APRN as PCP - General (Nurse Practitioner)  Cecilio Fairchild MD (Internal Medicine)  Gina Masterson MD as Gynecologist (Gynecology)   Independent Historians: Patient    HPI:  Chief Complaint: Right flank pain  A complete HPI/ROS/PMH/PSH/SH/FH are unobtainable due to: N/A    Chronic or social conditions impacting patient care (social determinants of health): None    Context: Jeanie Macdonald is a 48 y.o. female with past medical history of GERD who arrives to the ED with complaint of right flank pain.  Patient states that her symptoms have been going on for several weeks, going intermittently, is unable describe the pain and states it is mostly located in her right side of her abdomen and right flank.  States that this has been associated with some nausea and pain with urination but denies any blood in the urine, no nausea or vomiting, or diarrhea.    Review of prior external notes (non-ED): Last office visit with PCP on 11/3/2023 for UTI.    Review of prior external test results outside of this encounter: Patient had a gallbladder ultrasound on 6/21/2023 that was normal and a CT scan of the abdomen pelvis on 11/2/2022 that showed no abnormality.    PAST MEDICAL HISTORY  Active Ambulatory Problems     Diagnosis Date Noted    History of Helicobacter pylori infection 03/08/2021    Internal hemorrhoids 10/06/2022    Colon polyps 10/06/2022     Resolved Ambulatory Problems     Diagnosis Date Noted    Epigastric pain 03/08/2021    Nausea 03/08/2021    Absence of bladder continence 12/06/2022    Fibroids 12/06/2022    Abnormal uterine bleeding (AUB) 12/14/2022    S/P laparoscopic hysterectomy 02/23/2023     Past Medical History:   Diagnosis Date    Arthritis     GERD (gastroesophageal reflux disease)     H pylori ulcer     Hyperlipidemia        The patient  has started, but not completed, their COVID-19 vaccination series.    PAST SURGICAL HISTORY  Past Surgical History:   Procedure Laterality Date    COLONOSCOPY N/A 10/6/2022    Procedure: COLONOSCOPY TO CECUM WITH POLYPECTOMY ( COLD BX);  Surgeon: Jose Pascual MD;  Location:  [facility];  Service: General;  Laterality: N/A;  SCREENING  POST:COLON POLYP; HEMORRHOIDS    ENDOSCOPY N/A 3/22/2021    Procedure: ESOPHAGOGASTRODUODENOSCOPY WITH COLD BX'S;  Surgeon: Joseph Holden MD;  [facility];  Service: Gastroenterology;  Laterality: N/A;  pre: EPIGASTRIC PAIN, HX H. PYLORI  post: NORMAL    ENDOSCOPY N/A 10/6/2022    Procedure: ESOPHAGOGASTRODUODENOSCOPY WITH BIOPSY;  Surgeon: Jose Pascual MD;  [facility];  Service: General;  Laterality: N/A;  EPIGASTRIC PAIN  POST:HIATAL HERNIA    I & D / EXCISION MARSUPIALIZATION BARTHOLIN'S GLAND CYST / LYSIS LESIONS      TOTAL LAPAROSCOPIC HYSTERECTOMY SALPINGO OOPHORECTOMY Bilateral 2/23/2023    Procedure: TOTAL LAPAROSCOPIC HYSTERECTOMY, BILATERAL SALPINGECTOMY, and cystoscopy;  Surgeon: Gina Masterson MD;  Location: [facility] MAIN OR;  Service: Obstetrics/Gynecology;  Laterality: Bilateral;         FAMILY HISTORY  Family History   Problem Relation Age of Onset    Arthritis Mother     Hypertension Mother     Throat cancer Father     Lung cancer Sister     Colon cancer Other     Breast cancer Neg Hx     Uterine cancer Neg Hx     Ovarian cancer Neg Hx     Malig Hyperthermia Neg Hx          SOCIAL HISTORY  Social History     Socioeconomic History    Marital status: Single   Tobacco Use    Smoking status: Never     Passive exposure: Never    Smokeless tobacco: Never   Vaping Use    Vaping Use: Never used   Substance and Sexual Activity    Alcohol use: Not Currently    Drug use: Never    Sexual activity: Not Currently     Partners: Male     Birth control/protection: None, Hysterectomy         ALLERGIES  Patient has no  known allergies.        REVIEW OF SYSTEMS  Review of Systems     All systems reviewed and negative except for those discussed in HPI.       PHYSICAL EXAM    I have reviewed the triage vital signs and nursing notes.    ED Triage Vitals   Temp Heart Rate Resp BP SpO2   11/17/23 0024 11/17/23 0024 11/17/23 0024 11/17/23 0028 11/17/23 0024   98 °F (36.7 °C) 86 18 163/92 99 %      Temp src Heart Rate Source Patient Position BP Location FiO2 (%)   11/17/23 0024 11/17/23 0024 11/17/23 0028 11/17/23 0028 --   Tympanic Monitor Sitting Right arm        Physical Exam    GENERAL: alert and oriented x4, not distressed  HENT: normocephalic, atraumatic, moist mucous membranes  EYES: no scleral icterus, PERRL, EOMI  CV: regular rhythm, regular rate, no murmurs, rubs or gallops  RESPIRATORY: normal effort, CTAB  ABDOMEN: soft, mild diffuse right sided tenderness without rebound or guarding, normal bowel sounds  MUSCULOSKELETAL: no deformity  NEURO: alert, moves all extremities, no focal neuro deficits, follows commands  SKIN: warm, dry, no rash   Psych: Appropriate mood and affect      Nursing notes and vital signs reviewed      LAB RESULTS  Recent Results (from the past 24 hour(s))   Comprehensive Metabolic Panel    Collection Time: 11/17/23 12:31 AM    Specimen: Blood   Result Value Ref Range    Glucose 98 65 - 99 mg/dL    BUN 11 6 - 20 mg/dL    Creatinine 0.82 0.57 - 1.00 mg/dL    Sodium 137 136 - 145 mmol/L    Potassium 4.1 3.5 - 5.2 mmol/L    Chloride 103 98 - 107 mmol/L    CO2 25.0 22.0 - 29.0 mmol/L    Calcium 9.2 8.6 - 10.5 mg/dL    Total Protein 7.4 6.0 - 8.5 g/dL    Albumin 4.8 3.5 - 5.2 g/dL    ALT (SGPT) 19 1 - 33 U/L    AST (SGOT) 16 1 - 32 U/L    Alkaline Phosphatase 81 39 - 117 U/L    Total Bilirubin 0.2 0.0 - 1.2 mg/dL    Globulin 2.6 gm/dL    A/G Ratio 1.8 g/dL    BUN/Creatinine Ratio 13.4 7.0 - 25.0    Anion Gap 9.0 5.0 - 15.0 mmol/L    eGFR 87.8 >60.0 mL/min/1.73   Lipase    Collection Time: 11/17/23 12:31 AM     Specimen: Blood   Result Value Ref Range    Lipase 43 13 - 60 U/L   Green Top (Gel)    Collection Time: 11/17/23 12:31 AM   Result Value Ref Range    Extra Tube Hold for add-ons.    Lavender Top    Collection Time: 11/17/23 12:31 AM   Result Value Ref Range    Extra Tube hold for add-on    Gold Top - SST    Collection Time: 11/17/23 12:31 AM   Result Value Ref Range    Extra Tube Hold for add-ons.    Light Blue Top    Collection Time: 11/17/23 12:31 AM   Result Value Ref Range    Extra Tube Hold for add-ons.    CBC Auto Differential    Collection Time: 11/17/23 12:31 AM    Specimen: Blood   Result Value Ref Range    WBC 7.81 3.40 - 10.80 10*3/mm3    RBC 4.60 3.77 - 5.28 10*6/mm3    Hemoglobin 12.9 12.0 - 15.9 g/dL    Hematocrit 40.7 34.0 - 46.6 %    MCV 88.5 79.0 - 97.0 fL    MCH 28.0 26.6 - 33.0 pg    MCHC 31.7 31.5 - 35.7 g/dL    RDW 13.5 12.3 - 15.4 %    RDW-SD 44.0 37.0 - 54.0 fl    MPV 10.6 6.0 - 12.0 fL    Platelets 329 140 - 450 10*3/mm3    Neutrophil % 54.3 42.7 - 76.0 %    Lymphocyte % 36.0 19.6 - 45.3 %    Monocyte % 7.3 5.0 - 12.0 %    Eosinophil % 1.3 0.3 - 6.2 %    Basophil % 0.8 0.0 - 1.5 %    Immature Grans % 0.3 0.0 - 0.5 %    Neutrophils, Absolute 4.25 1.70 - 7.00 10*3/mm3    Lymphocytes, Absolute 2.81 0.70 - 3.10 10*3/mm3    Monocytes, Absolute 0.57 0.10 - 0.90 10*3/mm3    Eosinophils, Absolute 0.10 0.00 - 0.40 10*3/mm3    Basophils, Absolute 0.06 0.00 - 0.20 10*3/mm3    Immature Grans, Absolute 0.02 0.00 - 0.05 10*3/mm3    nRBC 0.0 0.0 - 0.2 /100 WBC   Urinalysis With Microscopic If Indicated (No Culture) - Urine, Clean Catch    Collection Time: 11/17/23 12:45 AM    Specimen: Urine, Clean Catch   Result Value Ref Range    Color, UA Yellow Yellow, Straw    Appearance, UA Clear Clear    pH, UA 5.5 5.0 - 8.0    Specific Gravity, UA 1.020 1.005 - 1.030    Glucose, UA Negative Negative    Ketones, UA Trace (A) Negative    Bilirubin, UA Negative Negative    Blood, UA Negative Negative    Protein, UA  Negative Negative    Leuk Esterase, UA Negative Negative    Nitrite, UA Negative Negative    Urobilinogen, UA 0.2 E.U./dL 0.2 - 1.0 E.U./dL       Ordered the above labs and independently reviewed and interpreted the results by me.        RADIOLOGY  CT Abdomen Pelvis With Contrast    Result Date: 11/17/2023  CT OF THE ABDOMEN PELVIS WITH CONTRAST  HISTORY: Right-sided pain  COMPARISON: March 22, 2021  TECHNIQUE: Axial CT imaging was obtained through the abdomen and pelvis. IV contrast was administered.  FINDINGS: Images through the lung bases are clear. No suspicious hepatic lesions are seen. The gallbladder, adrenal glands, spleen, pancreas, stomach, and duodenum are normal. Kidneys enhance symmetrically. There is no hydronephrosis. The patient has a duplicated collecting system on the left. Urinary bladder is normal. Uterus is absent. There is no bowel obstruction. The appendix is normal. No acute osseous abnormalities are seen.      No acute intra-abdominal or intrapelvic process is seen.  Radiation dose reduction techniques were utilized, including automated exposure control and exposure modulation based on body size.   This report was finalized on 11/17/2023 2:09 AM by Dr. Miya Pearson M.D on Workstation: [id number]       I ordered the above noted radiological studies.  These were independently interpreted and reviewed by me.  See dictation for official radiology interpretation.      PROCEDURES    Procedures      MEDICATIONS GIVEN IN ER    Medications   sodium chloride 0.9 % flush 10 mL (has no administration in time range)   sodium chloride 0.9 % flush 10 mL (has no administration in time range)   sodium chloride 0.9 % bolus 1,000 mL (0 mL Intravenous Stopped 11/17/23 0416)   HYDROmorphone (DILAUDID) injection 0.5 mg (0.5 mg Intravenous Given 11/17/23 0111)   ondansetron (ZOFRAN) injection 4 mg (4 mg Intravenous Given 11/17/23 0110)   iopamidol (ISOVUE-300) 61 % injection 85 mL (85 mL Intravenous Given  11/17/23 0139)         PROGRESS, DATA ANALYSIS, CONSULTS, AND MEDICAL DECISION MAKING    All labs have been independently reviewed by me.  All radiology studies have been reviewed by me and discussed with radiologist dictating the report.   EKG's independently viewed and interpreted by me.  Discussion below represents my analysis of pertinent findings related to patient's condition, differential diagnosis, treatment plan and final disposition.    DDx:  Includes, but is not limited to flank pain, musculoskeletal pain, kidney stone, cholelithiasis, cholecystitis, pyelonephritis, ureterolithiasis, UTI, diverticulitis      ED Course as of 11/17/23 0541   Fri Nov 17, 2023 0054 WBC: 7.81 [TANA]   0054 Hemoglobin: 12.9 [TANA]   0054 Hematocrit: 40.7 [TANA]   0054 Platelets: 329 [TANA]   0153 Glucose: 98 [TANA]   0153 BUN: 11 [TANA]   0153 Creatinine: 0.82 [TANA]   0153 Sodium: 137 [TANA]   0153 Potassium: 4.1 [TANA]   0153 Chloride: 103 [TANA]   0153 CO2: 25.0 [TANA]   0153 Lipase: 43 [TANA]   0153 Leukocytes, UA: Negative [TANA]   0153 Nitrite, UA: Negative [TANA]   0154 CT images independently viewed by me, I see no evidence for color disease and no obstructing uropathy, normal appendix. [TANA]   0333 Ketones, UA(!): Trace [TANA]   0336 Patient rechecked, resting comfortably in the stretcher, no acute distress.  Discussed results through the use of the  including normal urine, blood work, and CT scan that showed no acute abnormality with the patient.  Discussed plan for discharge with symptomatic treatment and short-term follow-up with PCP.  She expresses understanding and agrees with plan. [TANA]      ED Course User Index  [TANA] Milo Hernandez PA       MDM: Patient's valuation was unremarkable and there is no evidence for an acute or emergent process.  There is no evidence for appendicitis on CT scan, blood work and urine are also unremarkable and there is no evidence for urinary tract infection nor obstructing kidney stone.  After shared  decision-making discussion with the patient using the  patient expresses understanding and agrees with plan for discharge.    PPE:  The patient wore a mask and I wore a mask and all appropriate PPE throughout the entire patient encounter.      AS OF 05:41 EST VITALS:    BP - 118/69  HR - 91  TEMP - 98 °F (36.7 °C) (Tympanic)  O2 SATS - 93%      DIAGNOSIS  Final diagnoses:   Right flank pain         DISPOSITION  DISCHARGE    Patient discharged in stable condition.    Reviewed implications of results, diagnosis, meds, responsibility to follow up, warning signs and symptoms of possible worsening, potential complications and reasons to return to ER.    Patient/Family voiced understanding of above instructions.    Discussed plan for discharge, as there is no emergent indication for admission. Patient referred to primary care provider for BP management due to today's BP. Pt/family is agreeable and understands need for follow up and repeat testing.  Pt is aware that discharge does not mean that nothing is wrong but it indicates no emergency is present that requires admission and they must continue care with follow-up as given below or physician of their choice.     FOLLOW-UP  David Garcia, JE  [address]  [address] [address] [phone]    Schedule an appointment as soon as possible for a visit            Medication List        New Prescriptions      diclofenac 75 MG EC tablet  Commonly known as: VOLTAREN  Take 1 tablet by mouth 2 (Two) Times a Day.     ondansetron ODT 4 MG disintegrating tablet  Commonly known as: ZOFRAN-ODT  Place 1 tablet on the tongue Every 8 (Eight) Hours As Needed for Nausea or Vomiting.     pantoprazole 40 MG EC tablet  Commonly known as: PROTONIX  Take 1 tablet by mouth Daily.               Where to Get Your Medications        These medications were sent to [id number] - [place], [place] - [address] AT SEC OF [id number]  - [phone]  - [phone] FX  [address], [facility] [address]      Phone: [phone]   diclofenac 75 MG EC tablet  ondansetron ODT 4 MG disintegrating tablet  pantoprazole 40 MG EC tablet           Note Disclaimer: At [facility], we believe that sharing information builds trust and better relationships. You are receiving this note because you recently visited [facility]. It is possible you will see health information before a provider has talked with you about it. This kind of information can be easy to misunderstand. To help you fully understand what it means for your health, we urge you to discuss this note with your provider.       Milo Hernandez PA  11/17/23 0579

## 2024-08-04 ENCOUNTER — HOSPITAL ENCOUNTER (EMERGENCY)
Facility: HOSPITAL | Age: 49
Discharge: HOME | End: 2024-08-05
Attending: EMERGENCY MEDICINE | Admitting: EMERGENCY MEDICINE
Payer: MEDICAID

## 2024-08-04 ENCOUNTER — APPOINTMENT (OUTPATIENT)
Dept: CT IMAGING | Facility: HOSPITAL | Age: 49
End: 2024-08-04
Payer: MEDICAID

## 2024-08-04 DIAGNOSIS — R10.9 ACUTE ABDOMINAL PAIN: Primary | ICD-10-CM

## 2024-08-04 DIAGNOSIS — K44.9 HIATAL HERNIA: ICD-10-CM

## 2024-08-04 LAB
ALBUMIN SERPL-MCNC: 4.5 G/DL (ref 3.5–5.2)
ALBUMIN/GLOB SERPL: 1.5 G/DL
ALP SERPL-CCNC: 82 U/L (ref 39–117)
ALT SERPL W P-5'-P-CCNC: 15 U/L (ref 1–33)
ANION GAP SERPL CALCULATED.3IONS-SCNC: 9.5 MMOL/L (ref 5–15)
AST SERPL-CCNC: 17 U/L (ref 1–32)
BASOPHILS # BLD AUTO: 0.05 10*3/MM3 (ref 0–0.2)
BASOPHILS NFR BLD AUTO: 0.6 % (ref 0–1.5)
BILIRUB SERPL-MCNC: <0.2 MG/DL (ref 0–1.2)
BILIRUB UR QL STRIP: NEGATIVE
BUN SERPL-MCNC: 9 MG/DL (ref 6–20)
BUN/CREAT SERPL: 13.2 (ref 7–25)
CALCIUM SPEC-SCNC: 9.3 MG/DL (ref 8.6–10.5)
CHLORIDE SERPL-SCNC: 104 MMOL/L (ref 98–107)
CLARITY UR: CLEAR
CO2 SERPL-SCNC: 25.5 MMOL/L (ref 22–29)
COLOR UR: YELLOW
CREAT SERPL-MCNC: 0.68 MG/DL (ref 0.57–1)
DEPRECATED RDW RBC AUTO: 42.3 FL (ref 37–54)
EGFRCR SERPLBLD CKD-EPI 2021: 106.3 ML/MIN/1.73
EOSINOPHIL # BLD AUTO: 0.09 10*3/MM3 (ref 0–0.4)
EOSINOPHIL NFR BLD AUTO: 1 % (ref 0.3–6.2)
ERYTHROCYTE [DISTWIDTH] IN BLOOD BY AUTOMATED COUNT: 13.2 % (ref 12.3–15.4)
GLOBULIN UR ELPH-MCNC: 3.1 GM/DL
GLUCOSE SERPL-MCNC: 94 MG/DL (ref 65–99)
GLUCOSE UR STRIP-MCNC: NEGATIVE MG/DL
HCT VFR BLD AUTO: 41.1 % (ref 34–46.6)
HGB BLD-MCNC: 13.4 G/DL (ref 12–15.9)
HGB UR QL STRIP.AUTO: NEGATIVE
IMM GRANULOCYTES # BLD AUTO: 0.02 10*3/MM3 (ref 0–0.05)
IMM GRANULOCYTES NFR BLD AUTO: 0.2 % (ref 0–0.5)
KETONES UR QL STRIP: NEGATIVE
LEUKOCYTE ESTERASE UR QL STRIP.AUTO: NEGATIVE
LIPASE SERPL-CCNC: 51 U/L (ref 13–60)
LYMPHOCYTES # BLD AUTO: 2.88 10*3/MM3 (ref 0.7–3.1)
LYMPHOCYTES NFR BLD AUTO: 33.5 % (ref 19.6–45.3)
MCH RBC QN AUTO: 28.6 PG (ref 26.6–33)
MCHC RBC AUTO-ENTMCNC: 32.6 G/DL (ref 31.5–35.7)
MCV RBC AUTO: 87.6 FL (ref 79–97)
MONOCYTES # BLD AUTO: 0.66 10*3/MM3 (ref 0.1–0.9)
MONOCYTES NFR BLD AUTO: 7.7 % (ref 5–12)
NEUTROPHILS NFR BLD AUTO: 4.89 10*3/MM3 (ref 1.7–7)
NEUTROPHILS NFR BLD AUTO: 57 % (ref 42.7–76)
NITRITE UR QL STRIP: NEGATIVE
NRBC BLD AUTO-RTO: 0 /100 WBC (ref 0–0.2)
PH UR STRIP.AUTO: 6.5 [PH] (ref 5–8)
PLATELET # BLD AUTO: 300 10*3/MM3 (ref 140–450)
PMV BLD AUTO: 10.4 FL (ref 6–12)
POTASSIUM SERPL-SCNC: 4 MMOL/L (ref 3.5–5.2)
PROT SERPL-MCNC: 7.6 G/DL (ref 6–8.5)
PROT UR QL STRIP: NEGATIVE
RBC # BLD AUTO: 4.69 10*6/MM3 (ref 3.77–5.28)
SODIUM SERPL-SCNC: 139 MMOL/L (ref 136–145)
SP GR UR STRIP: <=1.005 (ref 1–1.03)
UROBILINOGEN UR QL STRIP: NORMAL
WBC NRBC COR # BLD AUTO: 8.59 10*3/MM3 (ref 3.4–10.8)

## 2024-08-04 PROCEDURE — 83690 ASSAY OF LIPASE: CPT | Performed by: EMERGENCY MEDICINE

## 2024-08-04 PROCEDURE — 81003 URINALYSIS AUTO W/O SCOPE: CPT | Performed by: EMERGENCY MEDICINE

## 2024-08-04 PROCEDURE — 25010000002 ONDANSETRON PER 1 MG: Performed by: EMERGENCY MEDICINE

## 2024-08-04 PROCEDURE — 96375 TX/PRO/DX INJ NEW DRUG ADDON: CPT

## 2024-08-04 PROCEDURE — 80053 COMPREHEN METABOLIC PANEL: CPT | Performed by: EMERGENCY MEDICINE

## 2024-08-04 PROCEDURE — 85025 COMPLETE CBC W/AUTO DIFF WBC: CPT | Performed by: EMERGENCY MEDICINE

## 2024-08-04 PROCEDURE — 25010000002 HYDROMORPHONE PER 4 MG: Performed by: EMERGENCY MEDICINE

## 2024-08-04 PROCEDURE — 74176 CT ABD & PELVIS W/O CONTRAST: CPT

## 2024-08-04 PROCEDURE — 99284 EMERGENCY DEPT VISIT MOD MDM: CPT

## 2024-08-04 PROCEDURE — 96374 THER/PROPH/DIAG INJ IV PUSH: CPT

## 2024-08-04 RX ORDER — ONDANSETRON 2 MG/ML
4 INJECTION INTRAMUSCULAR; INTRAVENOUS ONCE
Status: COMPLETED | OUTPATIENT
Start: 2024-08-04 | End: 2024-08-04

## 2024-08-04 RX ORDER — HYDROMORPHONE HYDROCHLORIDE 1 MG/ML
0.5 INJECTION, SOLUTION INTRAMUSCULAR; INTRAVENOUS; SUBCUTANEOUS ONCE
Status: COMPLETED | OUTPATIENT
Start: 2024-08-04 | End: 2024-08-04

## 2024-08-04 RX ORDER — SODIUM CHLORIDE 0.9 % (FLUSH) 0.9 %
10 SYRINGE (ML) INJECTION AS NEEDED
Status: DISCONTINUED | OUTPATIENT
Start: 2024-08-04 | End: 2024-08-05 | Stop reason: HOSPADM

## 2024-08-04 RX ADMIN — ONDANSETRON 4 MG: 2 INJECTION INTRAMUSCULAR; INTRAVENOUS at 22:03

## 2024-08-04 RX ADMIN — HYDROMORPHONE HYDROCHLORIDE 0.5 MG: 1 INJECTION, SOLUTION INTRAMUSCULAR; INTRAVENOUS; SUBCUTANEOUS at 22:03

## 2024-08-05 VITALS
OXYGEN SATURATION: 95 % | TEMPERATURE: 98 F | HEIGHT: 60 IN | BODY MASS INDEX: 24.54 KG/M2 | HEART RATE: 77 BPM | DIASTOLIC BLOOD PRESSURE: 82 MMHG | SYSTOLIC BLOOD PRESSURE: 151 MMHG | RESPIRATION RATE: 16 BRPM | WEIGHT: 125 LBS

## 2024-08-05 RX ORDER — PANTOPRAZOLE SODIUM 40 MG/1
40 TABLET, DELAYED RELEASE ORAL DAILY
Qty: 30 TABLET | Refills: 0 | Status: SHIPPED | OUTPATIENT
Start: 2024-08-05 | End: 2024-08-09

## 2024-08-05 RX ORDER — ONDANSETRON 4 MG/1
4 TABLET, ORALLY DISINTEGRATING ORAL EVERY 8 HOURS PRN
Qty: 12 TABLET | Refills: 0 | Status: SHIPPED | OUTPATIENT
Start: 2024-08-05

## 2024-08-05 RX ORDER — ONDANSETRON 4 MG/1
4 TABLET, ORALLY DISINTEGRATING ORAL EVERY 8 HOURS PRN
Qty: 12 TABLET | Refills: 0 | Status: SHIPPED | OUTPATIENT
Start: 2024-08-05 | End: 2024-08-05

## 2024-08-05 RX ORDER — SUCRALFATE 1 G/1
1 TABLET ORAL 4 TIMES DAILY
Qty: 20 TABLET | Refills: 0 | Status: SHIPPED | OUTPATIENT
Start: 2024-08-05 | End: 2024-08-05

## 2024-08-05 RX ORDER — PANTOPRAZOLE SODIUM 40 MG/1
40 TABLET, DELAYED RELEASE ORAL DAILY
Qty: 30 TABLET | Refills: 0 | Status: SHIPPED | OUTPATIENT
Start: 2024-08-05 | End: 2024-08-05

## 2024-08-05 RX ORDER — SUCRALFATE 1 G/1
1 TABLET ORAL 4 TIMES DAILY
Qty: 20 TABLET | Refills: 0 | Status: SHIPPED | OUTPATIENT
Start: 2024-08-05

## 2024-08-05 NOTE — ED PROVIDER NOTES
EMERGENCY DEPARTMENT ENCOUNTER  Room Number:  30/30  PCP: David Garcia APRN  Independent Historians: Patient      HPI:  Chief Complaint: had concerns including Flank Pain and Dysuria.     A complete HPI/ROS/PMH/PSH/SH/FH are unobtainable due to: None    Chronic or social conditions impacting patient care (Social Determinants of Health): None      Context: Jeanie Macdonald is a 49 y.o. female with a medical history of kidney stones and hemorrhoids who presents to the ED c/o acute abdominal discomfort.  Patient notes for the past week she has had pain radiating from left flank into the epigastric region with associated nausea.  While she has not noticed any blood in her urine or had pain with urination she has had some chills.  No cough, fevers, sore throat noted.      Review of prior external notes (non-ED) -and- Review of prior external test results outside of this encounter:  Unremarkable CT of the abdomen pelvis November 2023 and unremarkable gallbladder ultrasound June 2023      PAST MEDICAL HISTORY  Active Ambulatory Problems     Diagnosis Date Noted    History of Helicobacter pylori infection 03/08/2021    Internal hemorrhoids 10/06/2022    Colon polyps 10/06/2022     Resolved Ambulatory Problems     Diagnosis Date Noted    Epigastric pain 03/08/2021    Nausea 03/08/2021    Absence of bladder continence 12/06/2022    Fibroids 12/06/2022    Abnormal uterine bleeding (AUB) 12/14/2022    S/P laparoscopic hysterectomy 02/23/2023     Past Medical History:   Diagnosis Date    Arthritis     GERD (gastroesophageal reflux disease)     H pylori ulcer     Hyperlipidemia          PAST SURGICAL HISTORY  Past Surgical History:   Procedure Laterality Date    COLONOSCOPY N/A 10/6/2022    Procedure: COLONOSCOPY TO CECUM WITH POLYPECTOMY ( COLD BX);  Surgeon: Jose Pascual MD;  Location: [facility] ENDOSCOPY;  Service: General;  Laterality: N/A;  SCREENING  POST:COLON POLYP; HEMORRHOIDS    ENDOSCOPY N/A  3/22/2021    Procedure: ESOPHAGOGASTRODUODENOSCOPY WITH COLD BX'S;  Surgeon: Joseph Holden MD;  Location:  [facility] ENDOSCOPY;  Service: Gastroenterology;  Laterality: N/A;  pre: EPIGASTRIC PAIN, HX H. PYLORI  post: NORMAL    ENDOSCOPY N/A 10/6/2022    Procedure: ESOPHAGOGASTRODUODENOSCOPY WITH BIOPSY;  Surgeon: Jsoe Pascual MD;  Location:  [facility] ENDOSCOPY;  Service: General;  Laterality: N/A;  EPIGASTRIC PAIN  POST:HIATAL HERNIA    I & D / EXCISION MARSUPIALIZATION BARTHOLIN'S GLAND CYST / LYSIS LESIONS      TOTAL LAPAROSCOPIC HYSTERECTOMY SALPINGO OOPHORECTOMY Bilateral 2/23/2023    Procedure: TOTAL LAPAROSCOPIC HYSTERECTOMY, BILATERAL SALPINGECTOMY, and cystoscopy;  Surgeon: Gina Masterson MD;  Location: [facility] MAIN OR;  Service: Obstetrics/Gynecology;  Laterality: Bilateral;         FAMILY HISTORY  Family History   Problem Relation Age of Onset    Arthritis Mother     Hypertension Mother     Throat cancer Father     Lung cancer Sister     Colon cancer Other     Breast cancer Neg Hx     Uterine cancer Neg Hx     Ovarian cancer Neg Hx     Malig Hyperthermia Neg Hx          SOCIAL HISTORY  Social History     Socioeconomic History    Marital status: Single   Tobacco Use    Smoking status: Never     Passive exposure: Never    Smokeless tobacco: Never   Vaping Use    Vaping status: Never Used   Substance and Sexual Activity    Alcohol use: Not Currently    Drug use: Never    Sexual activity: Not Currently     Partners: Male     Birth control/protection: None, Hysterectomy         ALLERGIES  Patient has no known allergies.      REVIEW OF SYSTEMS  Review of Systems  Included in HPI  All systems reviewed and negative except for those discussed in HPI.      PHYSICAL EXAM    I have reviewed the triage vital signs and nursing notes.    ED Triage Vitals   Temp Heart Rate Resp BP SpO2   08/04/24 2018 08/04/24 2018 08/04/24 2018 08/04/24 2038 08/04/24 2018   98 °F (36.7 °C) 79 16 148/89 99 %      Temp src  Heart Rate Source Patient Position BP Location FiO2 (%)   08/04/24 2018 08/04/24 2018 08/04/24 2038 08/04/24 2038 --   Tympanic Monitor Sitting Right arm        Physical Exam    Physical Exam   Constitutional: No distress.  Nontoxic  HENT:  Head: Normocephalic and atraumatic.   Oropharynx: Mucous membranes are moist.   Eyes: . No scleral icterus. No conjunctival pallor.  Neck: Normal range of motion. Neck supple.   Cardiovascular: Pink warm and well perfused throughout.  Regular  Pulmonary/Chest: No respiratory distress.  No tachypnea or increased work of breathing appreciated.    Abdominal: Soft. There is mild left upper quadrant tenderness. There is no rebound and no guarding.  No rigidity  Musculoskeletal: Moves all extremities equally.    Neurological: Alert and oriented.  No acute focal deficit appreciated.  Skin: Skin is pink, warm, and dry.   Psychiatric: Mood and affect normal.   Nursing note and vitals reviewed.             LAB RESULTS  Recent Results (from the past 24 hour(s))   Comprehensive Metabolic Panel    Collection Time: 08/04/24  9:23 PM    Specimen: Blood   Result Value Ref Range    Glucose 94 65 - 99 mg/dL    BUN 9 6 - 20 mg/dL    Creatinine 0.68 0.57 - 1.00 mg/dL    Sodium 139 136 - 145 mmol/L    Potassium 4.0 3.5 - 5.2 mmol/L    Chloride 104 98 - 107 mmol/L    CO2 25.5 22.0 - 29.0 mmol/L    Calcium 9.3 8.6 - 10.5 mg/dL    Total Protein 7.6 6.0 - 8.5 g/dL    Albumin 4.5 3.5 - 5.2 g/dL    ALT (SGPT) 15 1 - 33 U/L    AST (SGOT) 17 1 - 32 U/L    Alkaline Phosphatase 82 39 - 117 U/L    Total Bilirubin <0.2 0.0 - 1.2 mg/dL    Globulin 3.1 gm/dL    A/G Ratio 1.5 g/dL    BUN/Creatinine Ratio 13.2 7.0 - 25.0    Anion Gap 9.5 5.0 - 15.0 mmol/L    eGFR 106.3 >60.0 mL/min/1.73   Lipase    Collection Time: 08/04/24  9:23 PM    Specimen: Blood   Result Value Ref Range    Lipase 51 13 - 60 U/L   CBC Auto Differential    Collection Time: 08/04/24  9:23 PM    Specimen: Blood   Result Value Ref Range    WBC 8.59  3.40 - 10.80 10*3/mm3    RBC 4.69 3.77 - 5.28 10*6/mm3    Hemoglobin 13.4 12.0 - 15.9 g/dL    Hematocrit 41.1 34.0 - 46.6 %    MCV 87.6 79.0 - 97.0 fL    MCH 28.6 26.6 - 33.0 pg    MCHC 32.6 31.5 - 35.7 g/dL    RDW 13.2 12.3 - 15.4 %    RDW-SD 42.3 37.0 - 54.0 fl    MPV 10.4 6.0 - 12.0 fL    Platelets 300 140 - 450 10*3/mm3    Neutrophil % 57.0 42.7 - 76.0 %    Lymphocyte % 33.5 19.6 - 45.3 %    Monocyte % 7.7 5.0 - 12.0 %    Eosinophil % 1.0 0.3 - 6.2 %    Basophil % 0.6 0.0 - 1.5 %    Immature Grans % 0.2 0.0 - 0.5 %    Neutrophils, Absolute 4.89 1.70 - 7.00 10*3/mm3    Lymphocytes, Absolute 2.88 0.70 - 3.10 10*3/mm3    Monocytes, Absolute 0.66 0.10 - 0.90 10*3/mm3    Eosinophils, Absolute 0.09 0.00 - 0.40 10*3/mm3    Basophils, Absolute 0.05 0.00 - 0.20 10*3/mm3    Immature Grans, Absolute 0.02 0.00 - 0.05 10*3/mm3    nRBC 0.0 0.0 - 0.2 /100 WBC   Urinalysis With Microscopic If Indicated (No Culture) - Urine, Clean Catch    Collection Time: 08/04/24  9:24 PM    Specimen: Urine, Clean Catch   Result Value Ref Range    Color, UA Yellow Yellow, Straw    Appearance, UA Clear Clear    pH, UA 6.5 5.0 - 8.0    Specific Gravity, UA <=1.005 1.005 - 1.030    Glucose, UA Negative Negative    Ketones, UA Negative Negative    Bilirubin, UA Negative Negative    Blood, UA Negative Negative    Protein, UA Negative Negative    Leuk Esterase, UA Negative Negative    Nitrite, UA Negative Negative    Urobilinogen, UA 0.2 E.U./dL 0.2 - 1.0 E.U./dL         RADIOLOGY  CT Abdomen Pelvis Stone Protocol    Result Date: 8/4/2024  CT OF THE ABDOMEN PELVIS WITHOUT CONTRAST  HISTORY: Left flank pain  COMPARISON: November 17, 2023  TECHNIQUE: Axial CT imaging was obtained through the abdomen and pelvis. No IV contrast was administered.  FINDINGS: Images through the lung bases are clear. No suspicious hepatic lesions are seen. The stomach, duodenum, adrenal glands, gallbladder, spleen, and pancreas are normal. No hydronephrosis is seen on  either side. Uterus is absent. No suspicious adnexal masses are seen. There is no bowel obstruction. The appendix is normal. No acute osseous abnormalities are seen.      No acute intra-abdominal or intrapelvic process is identified.  Radiation dose reduction techniques were utilized, including automated exposure control and exposure modulation based on body size.   This report was finalized on 8/4/2024 11:09 PM by Dr. Miya Pearson M.D on Workstation: [id number]         MEDICATIONS GIVEN IN ER  Medications   sodium chloride 0.9 % flush 10 mL (has no administration in time range)   HYDROmorphone (DILAUDID) injection 0.5 mg (0.5 mg Intravenous Given 8/4/24 2203)   ondansetron (ZOFRAN) injection 4 mg (4 mg Intravenous Given 8/4/24 2203)         ORDERS PLACED DURING THIS VISIT:  Orders Placed This Encounter   Procedures    CT Abdomen Pelvis Stone Protocol    Comprehensive Metabolic Panel    Lipase    Urinalysis With Microscopic If Indicated (No Culture) - Urine, Clean Catch    CBC Auto Differential    Monitor Blood Pressure    Monitor Blood Pressure    Continuous Pulse Oximetry    Insert Peripheral IV    CBC & Differential         OUTPATIENT MEDICATION MANAGEMENT:  Current Facility-Administered Medications Ordered in Epic   Medication Dose Route Frequency Provider Last Rate Last Admin    sodium chloride 0.9 % flush 10 mL  10 mL Intravenous PRN Mike Matos MD         Current Outpatient Medications Ordered in Epic   Medication Sig Dispense Refill    ondansetron ODT (ZOFRAN-ODT) 4 MG disintegrating tablet Place 1 tablet on the tongue Every 8 (Eight) Hours As Needed for Nausea or Vomiting. 12 tablet 0    pantoprazole (PROTONIX) 40 MG EC tablet Take 1 tablet by mouth Daily. 30 tablet 0    sucralfate (CARAFATE) 1 g tablet Take 1 tablet by mouth 4 (Four) Times a Day. 20 tablet 0    diclofenac (VOLTAREN) 75 MG EC tablet Take 1 tablet by mouth 2 (Two) Times a Day. 20 tablet 0    docusate sodium (COLACE) 250 MG capsule  Take 1 capsule by mouth 2 (Two) Times a Day As Needed for Constipation. 60 capsule 1    Stahist AD 25-60 MG tablet Take 1 tablet by mouth Every 8 (Eight) Hours As Needed.      triamcinolone (KENALOG) 0.1 % ointment Apply 1 application  topically to the appropriate area as directed 2 (Two) Times a Day As Needed for Rash or Irritation. 30 g 0    valACYclovir (Valtrex) 1000 MG tablet Take 1 tab po bid x 5 days 30 tablet 1         PROCEDURES  Procedures            PROGRESS, DATA ANALYSIS, CONSULTS, AND MEDICAL DECISION MAKING  All labs have been independently interpreted by me.  All radiology studies have been reviewed by me. All EKG's have been independently viewed and interpreted by me.  Discussion below represents my analysis of pertinent findings related to patient's condition, differential diagnosis, treatment plan and final disposition.    Differential diagnosis:   My differential diagnosis for abdominal pain includes but is not limited to:  Gastritis, gastroenteritis, peptic ulcer disease, GERD, esophageal perforation, acute appendicitis, mesenteric adenitis, Meckel’s diverticulum, epiploic appendagitis, diverticulitis, colon cancer, ulcerative colitis, Crohn’s disease, intussusception, small bowel obstruction, adhesions, ischemic bowel, perforated viscus, ileus, obstipation, biliary colic, cholecystitis, cholelithiasis, Gary-Jose G Napoleon, hepatitis, pancreatitis, common bile duct obstruction, cholangitis, bile leak, splenic trauma, splenic rupture, splenic infarction, splenic abscess, abdominal abscess, ascites, spontaneous bacterial peritonitis, hernia, UTI, cystitis,ureterolithiasis, urinary obstruction, ovarian cyst, torsion, pregnancy, ectopic pregnancy, PID, pelvic abscess, mittelschmerz, endometriosis, AAA, myocardial infarction, pneumonia, cancer, porphyria, DKA, medications, sickle cell, viral syndrome, zoster      Clinical Scores:                  ED Course as of 08/05/24 0005   Sun Aug 04, 2024   8786  Specific Gravity, UA: <=1.005 [RS]   2149 Leukocytes, UA: Negative [RS]   2149 Nitrite, UA: Negative [RS]   2149 WBC: 8.59 [RS]   2149 Hemoglobin: 13.4 [RS]   2149 Platelets: 300 [RS]   2243 Lipase: 51 [RS]   2243 Glucose: 94 [RS]   2243 BUN: 9 [RS]   2243 Creatinine: 0.68 [RS]   2243 Sodium: 139 [RS]   2243 Potassium: 4.0 [RS]   2244 RADIOLOGY      Study: Noncontrast CT abdomen pelvis  Findings: No hydronephrosis or hydroureter identified  I independently viewed and interpreted these images contemporaneously with treatment.    [RS]   Mon Aug 05, 2024   0002 Via , I have reviewed all laboratory findings and CT results with the patient.  Suspect gastrointestinal inflammation.  Recommend she resume her Protonix, Carafate and follow-up with her treating surgeon, Dr. Pascual for consideration of recurrent EGD.  All questions addressed.  Patient agreeable discharge planning. [RS]      ED Course User Index  [RS] Mike Matos MD         Prescription drug monitoring program review:     AS OF 00:05 EDT VITALS:    BP - 130/85  HR - 79  TEMP - 98 °F (36.7 °C) (Tympanic)  O2 SATS - 99%    COMPLEXITY OF CARE  Admission was considered but after careful review of the patient's presentation, physical examination, diagnostic results, and response to treatment the patient may be safely discharged with outpatient follow-up.      DIAGNOSIS  Final diagnoses:   Acute abdominal pain   Hiatal hernia         DISPOSITION  ED Disposition       ED Disposition   Discharge    Condition   Stable    Comment   --                  DISCHARGE    Patient discharged in stable condition.    Reviewed implications of results, diagnosis, meds, responsibility to follow up, warning signs and symptoms of possible worsening, potential complications and reasons to return to ER.    Patient/Family voiced understanding of above instructions.    Discussed plan for discharge, as there is no emergent indication for admission. Patient referred to primary  care provider for regular health maintenance. Pt/family is agreeable and understands need for follow up and possible repeat testing.  Pt is aware that discharge does not mean that nothing is wrong but it indicates no emergency is present that requires admission and they must continue care with follow-up as given below or physician of their choice.     FOLLOW-UP  David Garcia APRN  [address]  [address]  [facility] [address]  [phone]    Schedule an appointment as soon as possible for a visit   As needed    Jose Pascual MD  [address] [facility] [address]  [facility] [address]  [phone]    Call in 1 day  Schedule close outpatient follow-up         Where to Get Your Medications        These medications were sent to [id number] - [place], [place] - [address] [facility] AT SEC OF [facility] - [phone]  - [phone] FX  [address] [facility] [address]      Phone: [phone]   ondansetron ODT 4 MG disintegrating tablet  pantoprazole 40 MG EC tablet  sucralfate 1 g tablet          Medication List      No changes were made to your prescriptions during this visit.           Please note that portions of this document were completed with a voice recognition program.    Note Disclaimer: At [facility], we believe that sharing information builds trust and better relationships. You are receiving this note because you recently visited [facility]. It is possible you will see health information before a provider has talked with you about it. This kind of information can be easy to misunderstand. To help you fully understand what it means for your health, we urge you to discuss this note with your provider.         Mike Matos MD  08/05/24 0005

## 2024-08-05 NOTE — ED TRIAGE NOTES
Pt ambulatory to triage from home with c/o left flank pain x 1 week - also states nausea and chills.  Pt states history of kidney stones.  Pt states painful urination.     Triage performed using  [id number]

## 2024-08-09 ENCOUNTER — OFFICE VISIT (OUTPATIENT)
Dept: SURGERY | Facility: CLINIC | Age: 49
End: 2024-08-09
Payer: MEDICAID

## 2024-08-09 VITALS
SYSTOLIC BLOOD PRESSURE: 148 MMHG | BODY MASS INDEX: 28.82 KG/M2 | WEIGHT: 146.8 LBS | DIASTOLIC BLOOD PRESSURE: 94 MMHG | HEIGHT: 60 IN

## 2024-08-09 DIAGNOSIS — R10.13 EPIGASTRIC PAIN: Primary | ICD-10-CM

## 2024-08-09 PROCEDURE — 1160F RVW MEDS BY RX/DR IN RCRD: CPT | Performed by: SURGERY

## 2024-08-09 PROCEDURE — 99214 OFFICE O/P EST MOD 30 MIN: CPT | Performed by: SURGERY

## 2024-08-09 PROCEDURE — 1159F MED LIST DOCD IN RCRD: CPT | Performed by: SURGERY

## 2024-08-09 RX ORDER — OMEPRAZOLE 40 MG/1
40 CAPSULE, DELAYED RELEASE ORAL DAILY
COMMUNITY
Start: 2024-02-27 | End: 2025-02-26

## 2024-08-09 RX ORDER — DICYCLOMINE HYDROCHLORIDE 10 MG/1
10 CAPSULE ORAL 4 TIMES DAILY
COMMUNITY
Start: 2024-02-27 | End: 2024-08-09 | Stop reason: SDUPTHER

## 2024-08-09 RX ORDER — FLUTICASONE PROPIONATE 50 MCG
1 SPRAY, SUSPENSION (ML) NASAL DAILY
COMMUNITY
Start: 2024-02-27 | End: 2025-02-26

## 2024-08-09 RX ORDER — LEVOCETIRIZINE DIHYDROCHLORIDE 5 MG/1
5 TABLET, FILM COATED ORAL DAILY
COMMUNITY
Start: 2024-02-27 | End: 2024-08-25

## 2024-08-09 RX ORDER — DICYCLOMINE HYDROCHLORIDE 10 MG/1
10 CAPSULE ORAL 4 TIMES DAILY
Qty: 60 CAPSULE | Refills: 0 | Status: SHIPPED | OUTPATIENT
Start: 2024-08-09

## 2024-08-09 RX ORDER — ERGOCALCIFEROL 1.25 MG/1
50000 CAPSULE ORAL
COMMUNITY
Start: 2024-02-27 | End: 2024-08-25

## 2024-08-09 NOTE — H&P (VIEW-ONLY)
CC: Abdominal pain     HPI: Patient is a very pleasant 49-year-old female that is here today for follow-up because of abdominal pain.  The patient reports she has been having abdominal pain for the last 1 week.  Abdominal pain happens anytime of the day.  Sometimes it is associated with food intake.  She reports feelings of hunger and abdominal discomfort that usually gets better with food intake.  The pain is 9 out of 10.  The pain radiates to the right upper quadrant as well as right upper back.  The pain has been associated with nausea but no episode of vomiting.  She has history of H. pylori and underwent upper endoscopy in 10/20/2022 that did not show any evidence of recurrent disease.  The patient underwent extensive workup in the past that included CT scan of the abdomen and pelvis, HIDA scan as well as ultrasound of the gallbladder.  There was no evidence of gallbladder disease and had a HIDA scan with ejection fraction 95%.  Denies any recent change of bowel habits.  Reports intermittent episode of loose stools but not diarrhea.  Denies any change in the color of the stool.  Patient takes omeprazole intermittently.  She was recently prescribed Carafate.  She does take antinausea medication as needed     PMH: History of Helicobacter pylori infection, internal hemorrhoids, colon polyps, hyperlipidemia, acid reflux, arthritis     PSH:   - Incisional drainage and marsupialization of Bartholin gland cyst  - Upper endoscopy 3/22/2021  - Upper endoscopy and colonoscopy 10/6/2022  - Total laparoscopic hysterectomy and bilateral salpingo-oophorectomy 2/23/2023     MEDS: Colace, Flonase, Xyzal, omeprazole 40 mg daily, Zofran, Kenalog ointment, valacyclovir, vitamin D     ALL: No known drug allergies     FH and SH: Family history of arthritis and hypertension.  Strong family history of gallbladder disease.  Denies smoking drinking or taking any drugs     ROS:   Reports nausea vomiting and abdominal pain  Report no  "changes of bowel habits  Denies any recent weight loss     PE:   Vitals:    08/09/24 1032   BP: 148/94   Weight: 66.6 kg (146 lb 12.8 oz)   Height: 152.4 cm (60\")     Body mass index is 28.67 kg/m².   Alert and oriented ×3, no acute distress.  Head is normocephalic and atraumatic.  Neck is supple  Breathing comfortable  Abdomen is soft and mildly tender to palpation throughout the abdomen.  There is no rebound or guarding is and there is no peritoneal signs.  No clubbing cyanosis or edema in lower or upper extremities     Diagnostic studies:   Imaging was reviewed by me  CT scan abdomen pelvis that was done on 8/4/2024: Showed no evidence of intra-abdominal abnormality.  Status post hysterectomy    CT scan abdomen pelvis 11/17/2023: No acute intra-abdominal abnormalities    Ultrasound of the gallbladder 6/21/2023 showing a normal gallbladder without stones    HIDA scan 10/7/2022: No biliary tree obstruction.  Ejection fraction 95%    Report from upper endoscopy 10/6/2022:   Normal oropharynx. - Z-line regular, 35 cm from the incisors. Biopsied. - Normal esophagus. - Small hiatal hernia.     Colonoscopy 10/6/2022: Small polyp in the rectosigmoid junction chronic    Biopsies show inflammation of the gastric mucosa, hyperplastic polyp    Labs 8/4/2024:   CBC, CMP, lipase normal.  There is no history of elevation of LFTs    Assessment and plan    The patient is a very pleasant 49-year-old with recurrent episodes of epigastric and right upper quadrant abdominal pain.  She has history of H. pylori.  She has been taking antiacid medication with mild control of her symptoms.  Discussed with the patient about further step.  I think that either her symptoms are related to irritable bowel syndrome, H. pylori gastritis or hyperkinesia of the gallbladder.  I discussed with the patient that I recommend that she undergoes upper endoscopy with biopsies for further workup.  If negative and due to the fact that she had multiple CT " scans in the past as well as upper endoscopy then we will proceed with robotic assisted laparoscopic cholecystectomy.   Discussed with her that with all her results being normal removing the gallbladder will be the next reasonable step to try to see if her symptoms improve.  She is strong family history of gallbladder disease    Risk and benefits of procedure including bleeding, infection, perforation discussed in detail with the patient that verbalized understanding and agreed with the plan    Jose Pascual MD  General, Minimally Invasive and Endoscopic Surgery  [facility]     [address], [address]  [place], [place], [address]  P: [phone]  F: [phone]

## 2024-08-09 NOTE — PROGRESS NOTES
CC: Abdominal pain     HPI: Patient is a very pleasant 49-year-old female that is here today for follow-up because of abdominal pain.  The patient reports she has been having abdominal pain for the last 1 week.  Abdominal pain happens anytime of the day.  Sometimes it is associated with food intake.  She reports feelings of hunger and abdominal discomfort that usually gets better with food intake.  The pain is 9 out of 10.  The pain radiates to the right upper quadrant as well as right upper back.  The pain has been associated with nausea but no episode of vomiting.  She has history of H. pylori and underwent upper endoscopy in 10/20/2022 that did not show any evidence of recurrent disease.  The patient underwent extensive workup in the past that included CT scan of the abdomen and pelvis, HIDA scan as well as ultrasound of the gallbladder.  There was no evidence of gallbladder disease and had a HIDA scan with ejection fraction 95%.  Denies any recent change of bowel habits.  Reports intermittent episode of loose stools but not diarrhea.  Denies any change in the color of the stool.  Patient takes omeprazole intermittently.  She was recently prescribed Carafate.  She does take antinausea medication as needed     PMH: History of Helicobacter pylori infection, internal hemorrhoids, colon polyps, hyperlipidemia, acid reflux, arthritis     PSH:   - Incisional drainage and marsupialization of Bartholin gland cyst  - Upper endoscopy 3/22/2021  - Upper endoscopy and colonoscopy 10/6/2022  - Total laparoscopic hysterectomy and bilateral salpingo-oophorectomy 2/23/2023     MEDS: Colace, Flonase, Xyzal, omeprazole 40 mg daily, Zofran, Kenalog ointment, valacyclovir, vitamin D     ALL: No known drug allergies     FH and SH: Family history of arthritis and hypertension.  Strong family history of gallbladder disease.  Denies smoking drinking or taking any drugs     ROS:   Reports nausea vomiting and abdominal pain  Report no  "changes of bowel habits  Denies any recent weight loss     PE:   Vitals:    08/09/24 1032   BP: 148/94   Weight: 66.6 kg (146 lb 12.8 oz)   Height: 152.4 cm (60\")     Body mass index is 28.67 kg/m².   Alert and oriented ×3, no acute distress.  Head is normocephalic and atraumatic.  Neck is supple  Breathing comfortable  Abdomen is soft and mildly tender to palpation throughout the abdomen.  There is no rebound or guarding is and there is no peritoneal signs.  No clubbing cyanosis or edema in lower or upper extremities     Diagnostic studies:   Imaging was reviewed by me  CT scan abdomen pelvis that was done on 8/4/2024: Showed no evidence of intra-abdominal abnormality.  Status post hysterectomy    CT scan abdomen pelvis 11/17/2023: No acute intra-abdominal abnormalities    Ultrasound of the gallbladder 6/21/2023 showing a normal gallbladder without stones    HIDA scan 10/7/2022: No biliary tree obstruction.  Ejection fraction 95%    Report from upper endoscopy 10/6/2022:   Normal oropharynx. - Z-line regular, 35 cm from the incisors. Biopsied. - Normal esophagus. - Small hiatal hernia.     Colonoscopy 10/6/2022: Small polyp in the rectosigmoid junction chronic    Biopsies show inflammation of the gastric mucosa, hyperplastic polyp    Labs 8/4/2024:   CBC, CMP, lipase normal.  There is no history of elevation of LFTs    Assessment and plan    The patient is a very pleasant 49-year-old with recurrent episodes of epigastric and right upper quadrant abdominal pain.  She has history of H. pylori.  She has been taking antiacid medication with mild control of her symptoms.  Discussed with the patient about further step.  I think that either her symptoms are related to irritable bowel syndrome, H. pylori gastritis or hyperkinesia of the gallbladder.  I discussed with the patient that I recommend that she undergoes upper endoscopy with biopsies for further workup.  If negative and due to the fact that she had multiple CT " scans in the past as well as upper endoscopy then we will proceed with robotic assisted laparoscopic cholecystectomy.   Discussed with her that with all her results being normal removing the gallbladder will be the next reasonable step to try to see if her symptoms improve.  She is strong family history of gallbladder disease    Risk and benefits of procedure including bleeding, infection, perforation discussed in detail with the patient that verbalized understanding and agreed with the plan    Jose Pascual MD  General, Minimally Invasive and Endoscopic Surgery  [facility]     [address], [address]  [place], [place], [address]  P: [phone]  F: [phone]

## 2024-09-05 ENCOUNTER — HOSPITAL ENCOUNTER (OUTPATIENT)
Facility: HOSPITAL | Age: 49
Setting detail: HOSPITAL OUTPATIENT SURGERY
Discharge: HOME | End: 2024-09-05
Attending: SURGERY | Admitting: SURGERY
Payer: MEDICAID

## 2024-09-05 ENCOUNTER — ANESTHESIA (OUTPATIENT)
Dept: GASTROENTEROLOGY | Facility: HOSPITAL | Age: 49
End: 2024-09-05
Payer: MEDICAID

## 2024-09-05 ENCOUNTER — ANESTHESIA EVENT (OUTPATIENT)
Dept: GASTROENTEROLOGY | Facility: HOSPITAL | Age: 49
End: 2024-09-05
Payer: MEDICAID

## 2024-09-05 VITALS
WEIGHT: 149 LBS | RESPIRATION RATE: 18 BRPM | DIASTOLIC BLOOD PRESSURE: 85 MMHG | SYSTOLIC BLOOD PRESSURE: 112 MMHG | BODY MASS INDEX: 29.25 KG/M2 | OXYGEN SATURATION: 100 % | HEIGHT: 60 IN | HEART RATE: 78 BPM

## 2024-09-05 DIAGNOSIS — R10.13 EPIGASTRIC PAIN: ICD-10-CM

## 2024-09-05 PROBLEM — K44.9 HIATAL HERNIA: Status: ACTIVE | Noted: 2024-09-05

## 2024-09-05 PROCEDURE — 25810000003 LACTATED RINGERS PER 1000 ML: Performed by: NURSE ANESTHETIST, CERTIFIED REGISTERED

## 2024-09-05 PROCEDURE — 25810000003 LACTATED RINGERS PER 1000 ML: Performed by: SURGERY

## 2024-09-05 PROCEDURE — 25010000002 PROPOFOL 10 MG/ML EMULSION: Performed by: NURSE ANESTHETIST, CERTIFIED REGISTERED

## 2024-09-05 PROCEDURE — 88305 TISSUE EXAM BY PATHOLOGIST: CPT | Performed by: SURGERY

## 2024-09-05 RX ORDER — EPHEDRINE SULFATE 50 MG/ML
5 INJECTION, SOLUTION INTRAVENOUS ONCE AS NEEDED
Status: DISCONTINUED | OUTPATIENT
Start: 2024-09-05 | End: 2024-09-05 | Stop reason: HOSPADM

## 2024-09-05 RX ORDER — LABETALOL HYDROCHLORIDE 5 MG/ML
5 INJECTION, SOLUTION INTRAVENOUS
Status: DISCONTINUED | OUTPATIENT
Start: 2024-09-05 | End: 2024-09-05 | Stop reason: HOSPADM

## 2024-09-05 RX ORDER — PROMETHAZINE HYDROCHLORIDE 25 MG/1
25 TABLET ORAL ONCE AS NEEDED
Status: DISCONTINUED | OUTPATIENT
Start: 2024-09-05 | End: 2024-09-05 | Stop reason: HOSPADM

## 2024-09-05 RX ORDER — FLUMAZENIL 0.1 MG/ML
0.2 INJECTION INTRAVENOUS AS NEEDED
Status: DISCONTINUED | OUTPATIENT
Start: 2024-09-05 | End: 2024-09-05 | Stop reason: HOSPADM

## 2024-09-05 RX ORDER — DIPHENHYDRAMINE HYDROCHLORIDE 50 MG/ML
12.5 INJECTION INTRAMUSCULAR; INTRAVENOUS
Status: DISCONTINUED | OUTPATIENT
Start: 2024-09-05 | End: 2024-09-05 | Stop reason: HOSPADM

## 2024-09-05 RX ORDER — SODIUM CHLORIDE, SODIUM LACTATE, POTASSIUM CHLORIDE, CALCIUM CHLORIDE 600; 310; 30; 20 MG/100ML; MG/100ML; MG/100ML; MG/100ML
30 INJECTION, SOLUTION INTRAVENOUS CONTINUOUS PRN
Status: DISCONTINUED | OUTPATIENT
Start: 2024-09-05 | End: 2024-09-05 | Stop reason: HOSPADM

## 2024-09-05 RX ORDER — HYDROMORPHONE HYDROCHLORIDE 2 MG/ML
0.5 INJECTION, SOLUTION INTRAMUSCULAR; INTRAVENOUS; SUBCUTANEOUS
Status: DISCONTINUED | OUTPATIENT
Start: 2024-09-05 | End: 2024-09-05 | Stop reason: HOSPADM

## 2024-09-05 RX ORDER — PROMETHAZINE HYDROCHLORIDE 25 MG/1
25 SUPPOSITORY RECTAL ONCE AS NEEDED
Status: DISCONTINUED | OUTPATIENT
Start: 2024-09-05 | End: 2024-09-05 | Stop reason: HOSPADM

## 2024-09-05 RX ORDER — ONDANSETRON 2 MG/ML
4 INJECTION INTRAMUSCULAR; INTRAVENOUS ONCE AS NEEDED
Status: DISCONTINUED | OUTPATIENT
Start: 2024-09-05 | End: 2024-09-05 | Stop reason: HOSPADM

## 2024-09-05 RX ORDER — DROPERIDOL 2.5 MG/ML
0.62 INJECTION, SOLUTION INTRAMUSCULAR; INTRAVENOUS
Status: DISCONTINUED | OUTPATIENT
Start: 2024-09-05 | End: 2024-09-05 | Stop reason: HOSPADM

## 2024-09-05 RX ORDER — HYDRALAZINE HYDROCHLORIDE 20 MG/ML
5 INJECTION INTRAMUSCULAR; INTRAVENOUS
Status: DISCONTINUED | OUTPATIENT
Start: 2024-09-05 | End: 2024-09-05 | Stop reason: HOSPADM

## 2024-09-05 RX ORDER — PROPOFOL 10 MG/ML
VIAL (ML) INTRAVENOUS AS NEEDED
Status: DISCONTINUED | OUTPATIENT
Start: 2024-09-05 | End: 2024-09-05 | Stop reason: SURG

## 2024-09-05 RX ORDER — IPRATROPIUM BROMIDE AND ALBUTEROL SULFATE 2.5; .5 MG/3ML; MG/3ML
3 SOLUTION RESPIRATORY (INHALATION) ONCE AS NEEDED
Status: DISCONTINUED | OUTPATIENT
Start: 2024-09-05 | End: 2024-09-05 | Stop reason: HOSPADM

## 2024-09-05 RX ORDER — LIDOCAINE HYDROCHLORIDE 20 MG/ML
INJECTION, SOLUTION INFILTRATION; PERINEURAL AS NEEDED
Status: DISCONTINUED | OUTPATIENT
Start: 2024-09-05 | End: 2024-09-05 | Stop reason: SURG

## 2024-09-05 RX ORDER — NALOXONE HCL 0.4 MG/ML
0.2 VIAL (ML) INJECTION AS NEEDED
Status: DISCONTINUED | OUTPATIENT
Start: 2024-09-05 | End: 2024-09-05 | Stop reason: HOSPADM

## 2024-09-05 RX ORDER — OXYCODONE AND ACETAMINOPHEN 7.5; 325 MG/1; MG/1
1 TABLET ORAL EVERY 4 HOURS PRN
Status: DISCONTINUED | OUTPATIENT
Start: 2024-09-05 | End: 2024-09-05 | Stop reason: HOSPADM

## 2024-09-05 RX ORDER — FENTANYL CITRATE 50 UG/ML
50 INJECTION, SOLUTION INTRAMUSCULAR; INTRAVENOUS
Status: DISCONTINUED | OUTPATIENT
Start: 2024-09-05 | End: 2024-09-05 | Stop reason: HOSPADM

## 2024-09-05 RX ORDER — HYDROCODONE BITARTRATE AND ACETAMINOPHEN 5; 325 MG/1; MG/1
1 TABLET ORAL ONCE AS NEEDED
Status: DISCONTINUED | OUTPATIENT
Start: 2024-09-05 | End: 2024-09-05 | Stop reason: HOSPADM

## 2024-09-05 RX ORDER — SODIUM CHLORIDE, SODIUM LACTATE, POTASSIUM CHLORIDE, CALCIUM CHLORIDE 600; 310; 30; 20 MG/100ML; MG/100ML; MG/100ML; MG/100ML
INJECTION, SOLUTION INTRAVENOUS CONTINUOUS PRN
Status: DISCONTINUED | OUTPATIENT
Start: 2024-09-05 | End: 2024-09-05 | Stop reason: SURG

## 2024-09-05 RX ADMIN — SODIUM CHLORIDE, POTASSIUM CHLORIDE, SODIUM LACTATE AND CALCIUM CHLORIDE: 600; 310; 30; 20 INJECTION, SOLUTION INTRAVENOUS at 10:33

## 2024-09-05 RX ADMIN — PROPOFOL 160 MCG/KG/MIN: 10 INJECTION, EMULSION INTRAVENOUS at 10:33

## 2024-09-05 RX ADMIN — SODIUM CHLORIDE, POTASSIUM CHLORIDE, SODIUM LACTATE AND CALCIUM CHLORIDE 30 ML/HR: 600; 310; 30; 20 INJECTION, SOLUTION INTRAVENOUS at 09:49

## 2024-09-05 RX ADMIN — PROPOFOL 50 MG: 10 INJECTION, EMULSION INTRAVENOUS at 10:39

## 2024-09-05 RX ADMIN — LIDOCAINE HYDROCHLORIDE 60 MG: 20 INJECTION, SOLUTION INFILTRATION; PERINEURAL at 10:33

## 2024-09-05 RX ADMIN — PROPOFOL 70 MG: 10 INJECTION, EMULSION INTRAVENOUS at 10:34

## 2024-09-05 RX ADMIN — PROPOFOL 50 MG: 10 INJECTION, EMULSION INTRAVENOUS at 10:35

## 2024-09-05 NOTE — DISCHARGE INSTRUCTIONS
For the next 24 hours patient needs to be with a responsible adult.    For 24 hours DO NOT drive, operate machinery, appliances, drink alcohol, make important decisions or sign legal documents.    Start with a light or bland diet if you are feeling sick to your stomach otherwise advance to regular diet as tolerated.    Follow recommendations on procedure report if provided by your doctor.    Call Dr Pascual for problems .    Problems may include but not limited to: large amounts of bleeding, trouble breathing, repeated vomiting, severe unrelieved pain, fever or chills.

## 2024-09-05 NOTE — ANESTHESIA POSTPROCEDURE EVALUATION
"Patient: Jeanie Macdonald    Procedure Summary       Date: 09/05/24 Room / Location:  [facility]    Anesthesia Start: 1029 Anesthesia Stop: 1046    Procedure: ESOPHAGOGASTRODUODENOSCOPY with cold biopsies (Esophagus) Diagnosis:       Epigastric pain      (Epigastric pain [R10.13])    Surgeons: Jose Pascual MD Provider: Graeme Noriega MD    Anesthesia Type: MAC ASA Status: 2            Anesthesia Type: MAC    Vitals  Vitals Value Taken Time   /85 09/05/24 1107   Temp     Pulse 77 09/05/24 1109   Resp 18 09/05/24 1106   SpO2 100 % 09/05/24 1109   Vitals shown include unfiled device data.        Post Anesthesia Care and Evaluation    Patient location during evaluation: bedside  Patient participation: complete - patient participated  Level of consciousness: sleepy but conscious  Pain score: 0  Pain management: adequate    Airway patency: patent  Anesthetic complications: No anesthetic complications    Cardiovascular status: acceptable  Respiratory status: acceptable  Hydration status: acceptable    Comments: /85 (BP Location: Left arm, Patient Position: Lying)   Pulse 78   Resp 18   Ht 152.4 cm (60\")   Wt 67.6 kg (149 lb)   LMP 02/01/2023   SpO2 100%   BMI 29.10 kg/m²       "

## 2024-09-05 NOTE — ANESTHESIA PREPROCEDURE EVALUATION
Anesthesia Evaluation     Patient summary reviewed and Nursing notes reviewed   NPO Solid Status: > 8 hours  NPO Liquid Status: > 4 hours           Airway   Mallampati: II  Neck ROM: full  No difficulty expected  Dental - normal exam     Pulmonary     breath sounds clear to auscultation  Cardiovascular     Rhythm: regular    (+) hyperlipidemia      Neuro/Psych  GI/Hepatic/Renal/Endo    (+) GERD, PUD    Musculoskeletal     Abdominal    Substance History      OB/GYN          Other   arthritis,                 Anesthesia Plan    ASA 2     MAC     intravenous induction     Anesthetic plan, risks, benefits, and alternatives have been provided, discussed and informed consent has been obtained with: patient.    CODE STATUS:

## 2024-09-06 LAB
CYTO UR: NORMAL
LAB AP CASE REPORT: NORMAL
PATH REPORT.FINAL DX SPEC: NORMAL
PATH REPORT.GROSS SPEC: NORMAL

## 2024-09-09 ENCOUNTER — TELEPHONE (OUTPATIENT)
Dept: SURGERY | Facility: CLINIC | Age: 49
End: 2024-09-09
Payer: MEDICAID

## 2024-09-09 RX ORDER — PANTOPRAZOLE SODIUM 40 MG/1
40 TABLET, DELAYED RELEASE ORAL DAILY
Qty: 30 TABLET | Refills: 1 | Status: SHIPPED | OUTPATIENT
Start: 2024-09-09 | End: 2025-09-09

## 2024-09-09 NOTE — TELEPHONE ENCOUNTER
I called the patient regarding her upper endoscopy results    Final Diagnosis   1.  Small bowel, duodenum, biopsy: Benign small bowel with-               -A. Normal intact villous architecture.               -B. No significant inflammation, no granuloma.               -C. No viral inclusions or other organisms on routinely stained sections.      2.  Small bowel, duodenal bulb, biopsy: Benign small bowel with-               -A. Normal intact villous architecture.               -B. No significant inflammation, no granuloma.               -C. No viral inclusions or other organisms on routinely stained sections.      3.  Stomach, biopsy: Benign gastric mucosa with               -A. Mild chronic inflammation (mild non-specific chronic gastritis).                -B. No intestinal metaplasia.               -C. No Helicobacter pylori.      4.  Stomach, antrum, biopsy: Benign gastric mucosa with               -A. Mild chronic inflammation (mild non-specific chronic gastritis).                -B. No intestinal metaplasia.               -C. No Helicobacter pylori.      5.  Gastroesophageal junction, biopsy: Benign squamous and gastric mucosa with-               -A. Chronic inflammation in the gastric mucosa.               -B. No intestinal metaplasia.               -C. No Helicobacter pylori.      She has chronic inflammation in the stomach as well as esophagus.  She has been taking omeprazole 40 mg daily.  Discussed with the patient about the need to switch to pantoprazole 40 mg daily for the next 2 months and see how she does.  Discussed with her about follow-up and if she does not have any improvement of her symptoms then we will discuss about possible cholecystectomy since she has ejection fraction 95%, that can be considered gallbladder hyperkinesia and can cause symptoms in the appropriate setting.   She understood

## 2024-09-10 ENCOUNTER — TELEPHONE (OUTPATIENT)
Dept: SURGERY | Facility: CLINIC | Age: 49
End: 2024-09-10
Payer: MEDICAID

## 2024-09-10 NOTE — TELEPHONE ENCOUNTER
----- Message from Jose Pascual sent at 9/9/2024 12:37 PM EDT -----  Please call the patient for appointment in 2 months.  Thank

## 2024-11-14 ENCOUNTER — TELEPHONE (OUTPATIENT)
Dept: SURGERY | Facility: CLINIC | Age: 49
End: 2024-11-14

## 2024-11-30 ENCOUNTER — HOSPITAL ENCOUNTER (EMERGENCY)
Facility: HOSPITAL | Age: 49
Discharge: HOME | End: 2024-11-30
Attending: EMERGENCY MEDICINE
Payer: MEDICAID

## 2024-11-30 VITALS
TEMPERATURE: 97 F | DIASTOLIC BLOOD PRESSURE: 77 MMHG | OXYGEN SATURATION: 97 % | RESPIRATION RATE: 16 BRPM | HEART RATE: 81 BPM | SYSTOLIC BLOOD PRESSURE: 126 MMHG

## 2024-11-30 DIAGNOSIS — R39.15 URINARY URGENCY: Primary | ICD-10-CM

## 2024-11-30 LAB
ALBUMIN SERPL-MCNC: 4.3 G/DL (ref 3.5–5.2)
ALBUMIN/GLOB SERPL: 1.5 G/DL
ALP SERPL-CCNC: 76 U/L (ref 39–117)
ALT SERPL W P-5'-P-CCNC: 16 U/L (ref 1–33)
ANION GAP SERPL CALCULATED.3IONS-SCNC: 10 MMOL/L (ref 5–15)
AST SERPL-CCNC: 17 U/L (ref 1–32)
BASOPHILS # BLD AUTO: 0.03 10*3/MM3 (ref 0–0.2)
BASOPHILS NFR BLD AUTO: 0.5 % (ref 0–1.5)
BILIRUB SERPL-MCNC: 0.4 MG/DL (ref 0–1.2)
BILIRUB UR QL STRIP: NEGATIVE
BUN SERPL-MCNC: 11 MG/DL (ref 6–20)
BUN/CREAT SERPL: 14.5 (ref 7–25)
CALCIUM SPEC-SCNC: 9.1 MG/DL (ref 8.6–10.5)
CHLORIDE SERPL-SCNC: 107 MMOL/L (ref 98–107)
CLARITY UR: CLEAR
CO2 SERPL-SCNC: 21 MMOL/L (ref 22–29)
COLOR UR: YELLOW
CREAT SERPL-MCNC: 0.76 MG/DL (ref 0.57–1)
DEPRECATED RDW RBC AUTO: 38.8 FL (ref 37–54)
EGFRCR SERPLBLD CKD-EPI 2021: 95.6 ML/MIN/1.73
EOSINOPHIL # BLD AUTO: 0.06 10*3/MM3 (ref 0–0.4)
EOSINOPHIL NFR BLD AUTO: 0.9 % (ref 0.3–6.2)
ERYTHROCYTE [DISTWIDTH] IN BLOOD BY AUTOMATED COUNT: 12.8 % (ref 12.3–15.4)
GLOBULIN UR ELPH-MCNC: 2.8 GM/DL
GLUCOSE SERPL-MCNC: 98 MG/DL (ref 65–99)
GLUCOSE UR STRIP-MCNC: NEGATIVE MG/DL
HCT VFR BLD AUTO: 39.6 % (ref 34–46.6)
HGB BLD-MCNC: 13.4 G/DL (ref 12–15.9)
HGB UR QL STRIP.AUTO: NEGATIVE
HOLD SPECIMEN: NORMAL
HOLD SPECIMEN: NORMAL
IMM GRANULOCYTES # BLD AUTO: 0.02 10*3/MM3 (ref 0–0.05)
IMM GRANULOCYTES NFR BLD AUTO: 0.3 % (ref 0–0.5)
KETONES UR QL STRIP: NEGATIVE
LEUKOCYTE ESTERASE UR QL STRIP.AUTO: NEGATIVE
LIPASE SERPL-CCNC: 36 U/L (ref 13–60)
LYMPHOCYTES # BLD AUTO: 1.94 10*3/MM3 (ref 0.7–3.1)
LYMPHOCYTES NFR BLD AUTO: 29.7 % (ref 19.6–45.3)
MCH RBC QN AUTO: 28.6 PG (ref 26.6–33)
MCHC RBC AUTO-ENTMCNC: 33.8 G/DL (ref 31.5–35.7)
MCV RBC AUTO: 84.4 FL (ref 79–97)
MONOCYTES # BLD AUTO: 0.46 10*3/MM3 (ref 0.1–0.9)
MONOCYTES NFR BLD AUTO: 7 % (ref 5–12)
NEUTROPHILS NFR BLD AUTO: 4.02 10*3/MM3 (ref 1.7–7)
NEUTROPHILS NFR BLD AUTO: 61.6 % (ref 42.7–76)
NITRITE UR QL STRIP: NEGATIVE
NRBC BLD AUTO-RTO: 0 /100 WBC (ref 0–0.2)
PH UR STRIP.AUTO: 6 [PH] (ref 5–8)
PLATELET # BLD AUTO: 295 10*3/MM3 (ref 140–450)
PMV BLD AUTO: 10.5 FL (ref 6–12)
POTASSIUM SERPL-SCNC: 4.3 MMOL/L (ref 3.5–5.2)
PROT SERPL-MCNC: 7.1 G/DL (ref 6–8.5)
PROT UR QL STRIP: NEGATIVE
RBC # BLD AUTO: 4.69 10*6/MM3 (ref 3.77–5.28)
SODIUM SERPL-SCNC: 138 MMOL/L (ref 136–145)
SP GR UR STRIP: 1.02 (ref 1–1.03)
UROBILINOGEN UR QL STRIP: NORMAL
WBC NRBC COR # BLD AUTO: 6.53 10*3/MM3 (ref 3.4–10.8)
WHOLE BLOOD HOLD COAG: NORMAL
WHOLE BLOOD HOLD SPECIMEN: NORMAL

## 2024-11-30 PROCEDURE — 81003 URINALYSIS AUTO W/O SCOPE: CPT | Performed by: EMERGENCY MEDICINE

## 2024-11-30 PROCEDURE — 83690 ASSAY OF LIPASE: CPT | Performed by: EMERGENCY MEDICINE

## 2024-11-30 PROCEDURE — 85025 COMPLETE CBC W/AUTO DIFF WBC: CPT | Performed by: EMERGENCY MEDICINE

## 2024-11-30 PROCEDURE — 80053 COMPREHEN METABOLIC PANEL: CPT | Performed by: EMERGENCY MEDICINE

## 2024-11-30 PROCEDURE — 99283 EMERGENCY DEPT VISIT LOW MDM: CPT

## 2024-11-30 RX ORDER — PHENAZOPYRIDINE HYDROCHLORIDE 100 MG/1
100 TABLET, FILM COATED ORAL ONCE
Status: COMPLETED | OUTPATIENT
Start: 2024-11-30 | End: 2024-11-30

## 2024-11-30 RX ORDER — PHENAZOPYRIDINE HYDROCHLORIDE 200 MG/1
TABLET, FILM COATED ORAL
Qty: 9 TABLET | Refills: 0 | Status: SHIPPED | OUTPATIENT
Start: 2024-11-30

## 2024-11-30 RX ORDER — SODIUM CHLORIDE 0.9 % (FLUSH) 0.9 %
10 SYRINGE (ML) INJECTION AS NEEDED
Status: DISCONTINUED | OUTPATIENT
Start: 2024-11-30 | End: 2024-11-30 | Stop reason: HOSPADM

## 2024-11-30 RX ADMIN — PHENAZOPYRIDINE 100 MG: 100 TABLET ORAL at 13:38

## 2024-11-30 NOTE — ED PROVIDER NOTES
EMERGENCY DEPARTMENT ENCOUNTER  Room Number:  29/29  PCP: David Garcia APRN  Independent Historians: Spouse via       HPI:  Chief Complaint: Urinary urgency and left flank pain    A complete HPI/ROS/PMH/PSH/SH/FH are unobtainable due to: None    Chronic or social conditions impacting patient care (Social Determinants of Health): None      Context: Jeanie Macdonald is a 49 y.o. female with a medical history of H. pylori, internal hemorrhoids and hiatal hernia who presents to the ED c/o acute urinary urgency, frequency, and some left greater than right flank pain developing over the last 2 weeks.  No fever, nausea, vomiting.  No clear exacerbating relieving factor.  Patient reports she has to pee so quickly that she frequently does not make to the restroom.  No saddle anesthesia or focal weakness of the lower extremities reported.      Review of prior external notes (non-ED) -and- Review of prior external test results outside of this encounter:  Abdomen/pelvis August 2024 and November 2023 revealed no acute pathology on either set of imaging studies.      PAST MEDICAL HISTORY  Active Ambulatory Problems     Diagnosis Date Noted    History of Helicobacter pylori infection 03/08/2021    Internal hemorrhoids 10/06/2022    Colon polyps 10/06/2022    Epigastric pain 08/09/2024    Hiatal hernia 09/05/2024     Resolved Ambulatory Problems     Diagnosis Date Noted    Epigastric pain 03/08/2021    Nausea 03/08/2021    Absence of bladder continence 12/06/2022    Fibroids 12/06/2022    Abnormal uterine bleeding (AUB) 12/14/2022    S/P laparoscopic hysterectomy 02/23/2023     Past Medical History:   Diagnosis Date    Arthritis     GERD (gastroesophageal reflux disease)     H pylori ulcer     Hyperlipidemia          PAST SURGICAL HISTORY  Past Surgical History:   Procedure Laterality Date    COLONOSCOPY N/A 10/6/2022    Procedure: COLONOSCOPY TO CECUM WITH POLYPECTOMY ( COLD BX);  Surgeon: Ankur  Jose Calderon MD;  Location: [facility] ENDOSCOPY;  Service: General;  Laterality: N/A;  SCREENING  POST:COLON POLYP; HEMORRHOIDS    ENDOSCOPY N/A 3/22/2021    Procedure: ESOPHAGOGASTRODUODENOSCOPY WITH COLD BX'S;  Surgeon: Joseph Holden MD;  Location: [facility] END[facility];  Service: Gastroenterology;  Laterality: N/A;  pre: EPIGASTRIC PAIN, HX H. PYLORI  post: NORMAL    ENDOSCOPY N/A 10/6/2022    Procedure: ESOPHAGOGASTRODUODENOSCOPY WITH BIOPSY;  Surgeon: Jose Pascual MD;  Location:  [facility] ENDOSCOPY;  Service: General;  Laterality: N/A;  EPIGASTRIC PAIN  POST:HIATAL HERNIA    ENDOSCOPY N/A 9/5/2024    Procedure: ESOPHAGOGASTRODUODENOSCOPY with cold biopsies;  Surgeon: Jose Pascual MD;  Location: [facility] ENDOSCOPY;  Service: General;  Laterality: N/A;  Pre - abominal pain.  Post - hiatal hernia    I & D / EXCISION MARSUPIALIZATION BARTHOLIN'S GLAND CYST / LYSIS LESIONS      TOTAL LAPAROSCOPIC HYSTERECTOMY SALPINGO OOPHORECTOMY Bilateral 2/23/2023    Procedure: TOTAL LAPAROSCOPIC HYSTERECTOMY, BILATERAL SALPINGECTOMY, and cystoscopy;  Surgeon: Gina Masterson MD;  Location: [facility] MAIN OR;  Service: Obstetrics/Gynecology;  Laterality: Bilateral;         FAMILY HISTORY  Family History   Problem Relation Age of Onset    Arthritis Mother     Hypertension Mother     Throat cancer Father     Lung cancer Sister     Colon cancer Other     Breast cancer Neg Hx     Uterine cancer Neg Hx     Ovarian cancer Neg Hx     Malig Hyperthermia Neg Hx          SOCIAL HISTORY  Social History     Socioeconomic History    Marital status: Single   Tobacco Use    Smoking status: Never     Passive exposure: Never    Smokeless tobacco: Never   Vaping Use    Vaping status: Never Used   Substance and Sexual Activity    Alcohol use: Not Currently    Drug use: Never    Sexual activity: Not Currently     Partners: Male     Birth control/protection: None, Hysterectomy         ALLERGIES  Patient has no known  allergies.      REVIEW OF SYSTEMS  Review of Systems  Included in HPI  All systems reviewed and negative except for those discussed in HPI.      PHYSICAL EXAM    I have reviewed the triage vital signs and nursing notes.    ED Triage Vitals   Temp Heart Rate Resp BP SpO2   11/30/24 1231 11/30/24 1231 11/30/24 1249 11/30/24 1246 11/30/24 1231   97 °F (36.1 °C) 76 16 126/77 100 %      Temp src Heart Rate Source Patient Position BP Location FiO2 (%)   -- -- -- -- --              Physical Exam    Physical Exam   Constitutional: No distress.  Nontoxic  HENT:  Head: Normocephalic and atraumatic.   Oropharynx: Mucous membranes are moist.   Eyes: . No scleral icterus. No conjunctival pallor.  Neck: Normal range of motion. Neck supple.   Cardiovascular: Pink warm and well perfused throughout.    Pulmonary/Chest: No respiratory distress.  No tachypnea or increased work of breathing appreciated.    Abdominal: Soft. There is no tenderness. There is no rebound and no guarding.  Benign abdominal exam. No CVA tenderness to percussion  Musculoskeletal: Moves all extremities equally.    Neurological: Alert and oriented.  No acute focal deficit appreciated.  Sensation grossly intact to light touch throughout bilateral lower extremities with 2+ bilateral patellar reflexes.  Strong plantarflexion and dorsiflexion.  Atraumatic lower extremities  Skin: Skin is pink, warm, and dry.   Psychiatric: Mood and affect normal.   Nursing note and vitals reviewed.             LAB RESULTS  Recent Results (from the past 24 hours)   Green Top (Gel)    Collection Time: 11/30/24 12:50 PM   Result Value Ref Range    Extra Tube Hold for add-ons.    Lavender Top    Collection Time: 11/30/24 12:50 PM   Result Value Ref Range    Extra Tube hold for add-on    Gold Top - SST    Collection Time: 11/30/24 12:50 PM   Result Value Ref Range    Extra Tube Hold for add-ons.    Light Blue Top    Collection Time: 11/30/24 12:50 PM   Result Value Ref Range    Extra  Tube Hold for add-ons.    Comprehensive Metabolic Panel    Collection Time: 11/30/24 12:50 PM    Specimen: Blood   Result Value Ref Range    Glucose 98 65 - 99 mg/dL    BUN 11 6 - 20 mg/dL    Creatinine 0.76 0.57 - 1.00 mg/dL    Sodium 138 136 - 145 mmol/L    Potassium 4.3 3.5 - 5.2 mmol/L    Chloride 107 98 - 107 mmol/L    CO2 21.0 (L) 22.0 - 29.0 mmol/L    Calcium 9.1 8.6 - 10.5 mg/dL    Total Protein 7.1 6.0 - 8.5 g/dL    Albumin 4.3 3.5 - 5.2 g/dL    ALT (SGPT) 16 1 - 33 U/L    AST (SGOT) 17 1 - 32 U/L    Alkaline Phosphatase 76 39 - 117 U/L    Total Bilirubin 0.4 0.0 - 1.2 mg/dL    Globulin 2.8 gm/dL    A/G Ratio 1.5 g/dL    BUN/Creatinine Ratio 14.5 7.0 - 25.0    Anion Gap 10.0 5.0 - 15.0 mmol/L    eGFR 95.6 >60.0 mL/min/1.73   Lipase    Collection Time: 11/30/24 12:50 PM    Specimen: Blood   Result Value Ref Range    Lipase 36 13 - 60 U/L   CBC Auto Differential    Collection Time: 11/30/24 12:50 PM    Specimen: Blood   Result Value Ref Range    WBC 6.53 3.40 - 10.80 10*3/mm3    RBC 4.69 3.77 - 5.28 10*6/mm3    Hemoglobin 13.4 12.0 - 15.9 g/dL    Hematocrit 39.6 34.0 - 46.6 %    MCV 84.4 79.0 - 97.0 fL    MCH 28.6 26.6 - 33.0 pg    MCHC 33.8 31.5 - 35.7 g/dL    RDW 12.8 12.3 - 15.4 %    RDW-SD 38.8 37.0 - 54.0 fl    MPV 10.5 6.0 - 12.0 fL    Platelets 295 140 - 450 10*3/mm3    Neutrophil % 61.6 42.7 - 76.0 %    Lymphocyte % 29.7 19.6 - 45.3 %    Monocyte % 7.0 5.0 - 12.0 %    Eosinophil % 0.9 0.3 - 6.2 %    Basophil % 0.5 0.0 - 1.5 %    Immature Grans % 0.3 0.0 - 0.5 %    Neutrophils, Absolute 4.02 1.70 - 7.00 10*3/mm3    Lymphocytes, Absolute 1.94 0.70 - 3.10 10*3/mm3    Monocytes, Absolute 0.46 0.10 - 0.90 10*3/mm3    Eosinophils, Absolute 0.06 0.00 - 0.40 10*3/mm3    Basophils, Absolute 0.03 0.00 - 0.20 10*3/mm3    Immature Grans, Absolute 0.02 0.00 - 0.05 10*3/mm3    nRBC 0.0 0.0 - 0.2 /100 WBC   Urinalysis With Culture If Indicated - Urine, Clean Catch    Collection Time: 11/30/24 12:52 PM    Specimen:  Urine, Clean Catch   Result Value Ref Range    Color, UA Yellow Yellow, Straw    Appearance, UA Clear Clear    pH, UA 6.0 5.0 - 8.0    Specific Gravity, UA 1.018 1.005 - 1.030    Glucose, UA Negative Negative    Ketones, UA Negative Negative    Bilirubin, UA Negative Negative    Blood, UA Negative Negative    Protein, UA Negative Negative    Leuk Esterase, UA Negative Negative    Nitrite, UA Negative Negative    Urobilinogen, UA 0.2 E.U./dL 0.2 - 1.0 E.U./dL         RADIOLOGY  No Radiology Exams Resulted Within Past 24 Hours      MEDICATIONS GIVEN IN ER  Medications   sodium chloride 0.9 % flush 10 mL (has no administration in time range)   phenazopyridine (PYRIDIUM) tablet 100 mg (has no administration in time range)         ORDERS PLACED DURING THIS VISIT:  Orders Placed This Encounter   Procedures    [place] Draw    Urinalysis With Culture If Indicated - Urine, Clean Catch    Comprehensive Metabolic Panel    Lipase    CBC Auto Differential    Insert peripheral IV    Green Top (Gel)    Lavender Top    Gold Top - SST    Light Blue Top    CBC & Differential         OUTPATIENT MEDICATION MANAGEMENT:  Current Facility-Administered Medications Ordered in Epic   Medication Dose Route Frequency Provider Last Rate Last Admin    phenazopyridine (PYRIDIUM) tablet 100 mg  100 mg Oral Once Mike Matos MD        sodium chloride 0.9 % flush 10 mL  10 mL Intravenous PRN Mike Matos MD         Current Outpatient Medications Ordered in Epic   Medication Sig Dispense Refill    dicyclomine (BENTYL) 10 MG capsule Take 1 capsule by mouth 4 (Four) Times a Day. 60 capsule 0    docusate sodium (COLACE) 250 MG capsule Take 1 capsule by mouth 2 (Two) Times a Day As Needed for Constipation. 60 capsule 1    fluticasone (FLONASE) 50 MCG/ACT nasal spray 1 spray into the nostril(s) as directed by provider Daily.      ondansetron ODT (ZOFRAN-ODT) 4 MG disintegrating tablet Place 1 tablet on the tongue Every 8 (Eight) Hours As Needed for  Nausea or Vomiting. 12 tablet 0    pantoprazole (Protonix) 40 MG EC tablet Take 1 tablet by mouth Daily. 30 tablet 1    phenazopyridine (Pyridium) 200 MG tablet Take 1 tablet by mouth 3 times daily or bladder spasms 9 tablet 0    Stahist AD 25-60 MG tablet Take 1 tablet by mouth Every 8 (Eight) Hours As Needed.      sucralfate (CARAFATE) 1 g tablet Take 1 tablet by mouth 4 (Four) Times a Day. 20 tablet 0    triamcinolone (KENALOG) 0.1 % ointment Apply 1 application  topically to the appropriate area as directed 2 (Two) Times a Day As Needed for Rash or Irritation. 30 g 0    valACYclovir (Valtrex) 1000 MG tablet Take 1 tab po bid x 5 days 30 tablet 1         PROCEDURES  Procedures            PROGRESS, DATA ANALYSIS, CONSULTS, AND MEDICAL DECISION MAKING  All labs have been independently interpreted by me.  All radiology studies have been reviewed by me. All EKG's have been independently viewed and interpreted by me.  Discussion below represents my analysis of pertinent findings related to patient's condition, differential diagnosis, treatment plan and final disposition.    Differential diagnosis:   My differential diagnosis for back pain includes but is not limited to:  Musculoskeletal strain, contusion, retroperitoneal hematoma, disc protrusion, vertebral fracture, transverse process fracture, rib fracture, facet syndrome, sacroiliac joint strain, sciatica, renal injury, splenic injury, pancreatic injury, osteoarthritis, lumbar spondylosis, spinal stenosis, ankylosing spondylitis, sacroiliac joint inflammation, pancreatitis, perforated peptic ulcer, diverticulitis, endometriosis, chronic PID, epidural abscess, osteomyelitis, retroperitoneal abscess, pyelonephritis, pneumonia, subphrenic abscess, tuberculosis, neurofibroma, meningioma, multiple myeloma, lymphoma, metastatic cancer, primary cancer, AAA, aortic dissection, spinal ischemia, referred pain, ureterolithiasis      Clinical Scores:                  ED Course  as of 11/30/24 1331   Sat Nov 30, 2024   1304 Specific Gravity, UA: 1.018 [RS]   1304 Glucose: Negative [RS]   1304 Ketones, UA: Negative [RS]   1304 Leukocytes, UA: Negative [RS]   1304 Nitrite, UA: Negative [RS]   1308 WBC: 6.53 [RS]   1308 Hemoglobin: 13.4 [RS]   1308 Platelets: 295 [RS]   1308 Immature Grans, Absolute: 0.02 [RS]   1317 I have reviewed recent CT abdomen pelvis from August of this year.  There were no intrarenal stones at that time and I have low suspicion that she is suffering from ureterolithiasis at this time.  Patient describes symptoms consistent with either cystitis or interstitial cystitis.  Given that her white blood cell count and urine are both unremarkable, I suspect interstitial cystitis.  Patient will ultimately need to see urogynecology but we will start her on Pyridium for short course and see how she tolerates this and if it relieves her symptoms.  I have warned her about the yellow color of her urine. [RS]   1330 Lipase: 36 [RS]   1330 Glucose: 98 [RS]   1330 BUN: 11 [RS]   1330 Creatinine: 0.76 [RS]   1330 Sodium: 138 [RS]   1330 Potassium: 4.3 [RS]   1330 ALT (SGPT): 16 [RS]   1330 Total Bilirubin: 0.4 [RS]      ED Course User Index  [RS] Mike Matos MD         Prescription drug monitoring program review:     AS OF 13:31 EST VITALS:    BP - 126/77  HR - 81  TEMP - 97 °F (36.1 °C)  O2 SATS - 97%    COMPLEXITY OF CARE  Admission was considered but after careful review of the patient's presentation, physical examination, diagnostic results, and response to treatment the patient may be safely discharged with outpatient follow-up.      DIAGNOSIS  Final diagnoses:   Urinary urgency         DISPOSITION  ED Disposition       ED Disposition   Discharge    Condition   Stable    Comment   --                  DISCHARGE    Patient discharged in stable condition.    Reviewed implications of results, diagnosis, meds, responsibility to follow up, warning signs and symptoms of possible  worsening, potential complications and reasons to return to ER.    Patient/Family voiced understanding of above instructions.    Discussed plan for discharge, as there is no emergent indication for admission. Patient referred to primary care provider for regular health maintenance. Pt/family is agreeable and understands need for follow up and possible repeat testing.  Pt is aware that discharge does not mean that nothing is wrong but it indicates no emergency is present that requires admission and they must continue care with follow-up as given below or physician of their choice.     FOLLOW-UP  David Garcia, EJ  [address]  [address]  [facility] [address]  [phone]    Schedule an appointment as soon as possible for a visit   As needed    Cynthia Wyatt MD  [address]  [address] [address]  [facility] [address]  [phone]    Call in 3 days  as soon as available         Medication List        New Prescriptions      phenazopyridine 200 MG tablet  Commonly known as: Pyridium  Take 1 tablet by mouth 3 times daily or bladder spasms               Where to Get Your Medications        You can get these medications from any pharmacy    Bring a paper prescription for each of these medications  phenazopyridine 200 MG tablet           Please note that portions of this document were completed with a voice recognition program.    Note Disclaimer: At [facility], we believe that sharing information builds trust and better relationships. You are receiving this note because you recently visited [facility]. It is possible you will see health information before a provider has talked with you about it. This kind of information can be easy to misunderstand. To help you fully understand what it means for your health, we urge you to discuss this note with your provider.         Mike Matos MD  11/30/24 0528

## 2025-07-25 ENCOUNTER — APPOINTMENT (OUTPATIENT)
Dept: GENERAL RADIOLOGY | Facility: HOSPITAL | Age: 50
End: 2025-07-25
Payer: MEDICAID

## 2025-07-25 ENCOUNTER — HOSPITAL ENCOUNTER (EMERGENCY)
Facility: HOSPITAL | Age: 50
Discharge: HOME | End: 2025-07-25
Attending: EMERGENCY MEDICINE
Payer: MEDICAID

## 2025-07-25 VITALS
BODY MASS INDEX: 28.47 KG/M2 | OXYGEN SATURATION: 97 % | SYSTOLIC BLOOD PRESSURE: 135 MMHG | TEMPERATURE: 98 F | HEIGHT: 60 IN | RESPIRATION RATE: 16 BRPM | HEART RATE: 88 BPM | WEIGHT: 145 LBS | DIASTOLIC BLOOD PRESSURE: 69 MMHG

## 2025-07-25 DIAGNOSIS — M79.89 RIGHT AXILLARY SWELLING: ICD-10-CM

## 2025-07-25 DIAGNOSIS — M54.12 CERVICAL RADICULOPATHY: Primary | ICD-10-CM

## 2025-07-25 PROCEDURE — 99283 EMERGENCY DEPT VISIT LOW MDM: CPT

## 2025-07-25 PROCEDURE — 72050 X-RAY EXAM NECK SPINE 4/5VWS: CPT

## 2025-07-25 PROCEDURE — 25010000002 KETOROLAC TROMETHAMINE PER 15 MG: Performed by: PHYSICIAN ASSISTANT

## 2025-07-25 PROCEDURE — 96372 THER/PROPH/DIAG INJ SC/IM: CPT

## 2025-07-25 RX ORDER — PREDNISONE 20 MG/1
TABLET ORAL
Qty: 19 TABLET | Refills: 0 | Status: SHIPPED | OUTPATIENT
Start: 2025-07-25

## 2025-07-25 RX ORDER — METHOCARBAMOL 750 MG/1
TABLET, FILM COATED ORAL
Qty: 45 TABLET | Refills: 0 | Status: SHIPPED | OUTPATIENT
Start: 2025-07-25

## 2025-07-25 RX ORDER — KETOROLAC TROMETHAMINE 30 MG/ML
30 INJECTION, SOLUTION INTRAMUSCULAR; INTRAVENOUS ONCE
Status: COMPLETED | OUTPATIENT
Start: 2025-07-25 | End: 2025-07-25

## 2025-07-25 RX ORDER — LIDOCAINE 50 MG/G
1 PATCH TOPICAL EVERY 24 HOURS
Qty: 5 EACH | Refills: 0 | Status: SHIPPED | OUTPATIENT
Start: 2025-07-25

## 2025-07-25 RX ORDER — METHOCARBAMOL 750 MG/1
1500 TABLET, FILM COATED ORAL ONCE
Status: COMPLETED | OUTPATIENT
Start: 2025-07-25 | End: 2025-07-25

## 2025-07-25 RX ORDER — LIDOCAINE 4 G/G
1 PATCH TOPICAL
Status: DISCONTINUED | OUTPATIENT
Start: 2025-07-25 | End: 2025-07-25 | Stop reason: HOSPADM

## 2025-07-25 RX ADMIN — KETOROLAC TROMETHAMINE 30 MG: 30 INJECTION INTRAMUSCULAR; INTRAVENOUS at 12:53

## 2025-07-25 RX ADMIN — METHOCARBAMOL TABLETS 1500 MG: 750 TABLET, COATED ORAL at 12:54

## 2025-07-25 RX ADMIN — LIDOCAINE 1 PATCH: 4 PATCH TOPICAL at 12:26

## 2025-07-25 NOTE — ED PROVIDER NOTES
MD ATTESTATION NOTE    The LANDON and I have discussed this patient's history, physical exam, and treatment plan.  I have reviewed the documentation and personally had a face to face interaction with the patient. I affirm the documentation and agree with the treatment and plan.  The attached note describes my personal findings.        SHARED APC FACE TO FACE: I performed a substantive part of the MDM during the patient's E/M visit. I personally evaluated and examined the patient. I personally made or approved the documented management plan and acknowledge its risk of complications.      Brief HPI: Patient presents for evaluation of right neck pain.  Patient states he goes down into her right hand.  Has some tingling.  Has no weakness.  Patient has had no fevers or chills.  Also concerned about swelling in the right axilla    PHYSICAL EXAM  ED Triage Vitals   Temp Heart Rate Resp BP SpO2   07/25/25 1148 07/25/25 1148 07/25/25 1148 07/25/25 1151 07/25/25 1148   98 °F (36.7 °C) 88 16 158/89 97 %      Temp src Heart Rate Source Patient Position BP Location FiO2 (%)   07/25/25 1148 07/25/25 1148 -- -- --   Oral Monitor            GENERAL: no acute distress  HENT: nares patent  EYES: no scleral icterus  CV: regular rhythm, normal rate  RESPIRATORY: normal effort  ABDOMEN: soft  MUSCULOSKELETAL: no deformity.  Newness right trapezius.  Normal pulse  NEURO: alert, moves all extremities, follows commands.  Normal sensation  PSYCH:  calm, cooperative  SKIN: warm, dry    Vital signs and nursing notes reviewed.    ED Course as of 07/25/25 1422   Fri Jul 25, 2025   1255 XR Spine Cervical Complete 4 or 5 View  Radiology study independently interpreted by me and my findings are no acute fracture.   [DC]   1350 Discussed imaging results with patient. Will start on muscle relaxer, anti-inflammatories at home. She is to follow up with her PCP for axillary swelling. [DC]      ED Course User Index  [DC] Dolores Delaney PA         Plan:  discharge       Nato Wiley MD  07/25/25 0003

## 2025-07-25 NOTE — ED TRIAGE NOTES
Patient to ED via pv from home c/o right lateral neck pain x 1 week. Patient denies recent falls of injuries.

## 2025-07-25 NOTE — ED PROVIDER NOTES
EMERGENCY DEPARTMENT ENCOUNTER      PCP: David Garcia APRN  Patient Care Team:  David Garcia APRN as PCP - General (Nurse Practitioner)  Cecilio Fairchild MD (Internal Medicine)  Gina Masterson MD as Gynecologist (Gynecology)   Independent Historians: Patient    HPI:  Chief Complaint: Neck pain  A complete HPI/ROS/PMH/PSH/SH/FH are unobtainable due to: None    Chronic or social conditions impacting patient care (social determinants of health): None    Context: Jeanie Macdonald is a 50 y.o. female with history of abnormal uterine bleeding who presents to the ED c/o acute right sided neck pain radiating into her right arm worse over the past 3 days.  No reported injury or trauma.  No fevers or chills.  Patient also reports swelling and pain to her right axilla.  Patient states history of right sided breast mass and has been monitored with mammogram and has had negative biopsy.  No chest pain or shortness of breath.    Review of prior external notes and/or external test results outside of this encounter: Mammogram on 10/1/2024 showed no mammographic evidence of malignancy.  History of right needle biopsy in August 2021 showing fibroadenoma and right excisional biopsy was benign.      PAST MEDICAL HISTORY  Active Ambulatory Problems     Diagnosis Date Noted    History of Helicobacter pylori infection 03/08/2021    Internal hemorrhoids 10/06/2022    Colon polyps 10/06/2022    Epigastric pain 08/09/2024    Hiatal hernia 09/05/2024     Resolved Ambulatory Problems     Diagnosis Date Noted    Epigastric pain 03/08/2021    Nausea 03/08/2021    Absence of bladder continence 12/06/2022    Fibroids 12/06/2022    Abnormal uterine bleeding (AUB) 12/14/2022    S/P laparoscopic hysterectomy 02/23/2023     Past Medical History:   Diagnosis Date    Arthritis     GERD (gastroesophageal reflux disease)     H pylori ulcer     Hyperlipidemia        The patient has a COVID HM Topic on their chart, and they are  fully vaccinated.    PAST SURGICAL HISTORY  Past Surgical History:   Procedure Laterality Date    COLONOSCOPY N/A 10/6/2022    Procedure: COLONOSCOPY TO CECUM WITH POLYPECTOMY ( COLD BX);  Surgeon: Jose Pascual MD;  Location: [facility] ENDOSCOPY;  Service: General;  Laterality: N/A;  SCREENING  POST:COLON POLYP; HEMORRHOIDS    ENDOSCOPY N/A 3/22/2021    Procedure: ESOPHAGOGASTRODUODENOSCOPY WITH COLD BX'S;  Surgeon: Joseph Holden MD;  Location: [facility] ENDOSCOPY;  Service: Gastroenterology;  Laterality: N/A;  pre: EPIGASTRIC PAIN, HX H. PYLORI  post: NORMAL    ENDOSCOPY N/A 10/6/2022    Procedure: ESOPHAGOGASTRODUODENOSCOPY WITH BIOPSY;  Surgeon: Jose Pascual MD;  Location: [facility] ENDOSCOPY;  Service: General;  Laterality: N/A;  EPIGASTRIC PAIN  POST:HIATAL HERNIA    ENDOSCOPY N/A 9/5/2024    Procedure: ESOPHAGOGASTRODUODENOSCOPY with cold biopsies;  Surgeon: Jose Pascual MD;  Location: [facility] ENDOSCOPY;  Service: General;  Laterality: N/A;  Pre - abominal pain.  Post - hiatal hernia    I & D / EXCISION MARSUPIALIZATION BARTHOLIN'S GLAND CYST / LYSIS LESIONS      TOTAL LAPAROSCOPIC HYSTERECTOMY SALPINGO OOPHORECTOMY Bilateral 2/23/2023    Procedure: TOTAL LAPAROSCOPIC HYSTERECTOMY, BILATERAL SALPINGECTOMY, and cystoscopy;  Surgeon: Gina Masterson MD;  [facility] OR;  Service: Obstetrics/Gynecology;  Laterality: Bilateral;         FAMILY HISTORY  Family History   Problem Relation Age of Onset    Arthritis Mother     Hypertension Mother     Throat cancer Father     Lung cancer Sister     Colon cancer Other     Breast cancer Neg Hx     Uterine cancer Neg Hx     Ovarian cancer Neg Hx     Malig Hyperthermia Neg Hx          SOCIAL HISTORY  Social History     Socioeconomic History    Marital status: Single   Tobacco Use    Smoking status: Never     Passive exposure: Never    Smokeless tobacco: Never   Vaping Use    Vaping status: Never Used   Substance and Sexual  Activity    Alcohol use: Not Currently    Drug use: Never    Sexual activity: Not Currently     Partners: Male     Birth control/protection: None, Hysterectomy         ALLERGIES  Patient has no known allergies.        REVIEW OF SYSTEMS  Review of Systems   Constitutional:  Negative for chills and fever.   Musculoskeletal:  Positive for myalgias and neck pain.   Neurological:  Negative for weakness and numbness.        All systems reviewed and negative except for those discussed in HPI.       PHYSICAL EXAM    I have reviewed the triage vital signs and nursing notes.    ED Triage Vitals   Temp Heart Rate Resp BP SpO2   07/25/25 1148 07/25/25 1148 07/25/25 1148 07/25/25 1151 07/25/25 1148   98 °F (36.7 °C) 88 16 158/89 97 %      Temp src Heart Rate Source Patient Position BP Location FiO2 (%)   07/25/25 1148 07/25/25 1148 -- -- --   Oral Monitor          Physical Exam  GENERAL: alert, no acute distress  SKIN: Warm, dry  HENT: Normocephalic, atraumatic  EYES: no scleral icterus  CV: regular rhythm, regular rate  RESPIRATORY: normal effort, lungs clear  ABDOMEN: soft, nontender, nondistended  MUSCULOSKELETAL: muscle spasm and tenderness to the right upper trapezius musculature, normal strength of the right upper extremity, no midline tenderness of the cervical spine, normal sensation, no peripheral edema, patient has some swelling and ttp of the right axilla without erythema or fluctuance  NEURO: alert, moves all extremities, follows commands          LAB RESULTS  No results found for this or any previous visit (from the past 24 hours).    Ordered the above labs and independently reviewed and interpreted the results.        RADIOLOGY  XR Spine Cervical Complete 4 or 5 View  Result Date: 7/25/2025  XR SPINE CERVICAL COMPLETE 4 OR 5 VW-  Clinical: Right-sided neck pain  FINDINGS: C5-6 disc base narrowing, there is anterior spurring and posterior endplate hypertrophy. No subluxation. Prevertebral soft tissues are normal.  The odontoid is preserved with a satisfactory C1-2 alignment. The posterior elements have a satisfactory appearance.  CONCLUSION: C5-6 disc degeneration as described above, otherwise within normal limits.  This report was finalized on 7/25/2025 12:57 PM by Dr. Severino Aaron M.D on Workstation: [id number]        I ordered the above noted radiological studies. Independently reviewed and interpreted by me.  See dictation for official radiology interpretation.      PROCEDURES    Procedures      MEDICATIONS GIVEN IN ER    Medications   Lidocaine 4 % 1 patch (1 patch Transdermal Medication Applied 7/25/25 1226)   methocarbamol (ROBAXIN) tablet 1,500 mg (1,500 mg Oral Given 7/25/25 1254)   ketorolac (TORADOL) injection 30 mg (30 mg Intramuscular Given 7/25/25 1253)         PROGRESS, DATA ANALYSIS, CONSULTS, AND MEDICAL DECISION MAKING    All labs have been independently reviewed and interpreted by me.  All radiology studies have been independently reviewed and interpreted by me and discussed with radiologist dictating the report.   EKG's independently reviewed and interpreted by me.  Discussion below represents my analysis of pertinent findings related to patient's condition, differential diagnosis, treatment plan and final disposition.    Differential diagnosis: DDD, cervical fracture, muscle spasm    ED Course as of 07/25/25 1412   Fri Jul 25, 2025   1255 XR Spine Cervical Complete 4 or 5 View  Radiology study independently interpreted by me and my findings are no acute fracture.   [DC]   1350 Discussed imaging results with patient. Will start on muscle relaxer, anti-inflammatories at home. She is to follow up with her PCP for axillary swelling. [DC]      ED Course User Index  [DC] Dolores Delaney PA             AS OF 14:12 EDT VITALS:    BP - 135/69  HR - 88  TEMP - 98 °F (36.7 °C) (Oral)  O2 SATS - 97%        DIAGNOSIS  Final diagnoses:   Cervical radiculopathy   Right axillary swelling         DISPOSITION  ED  Disposition       ED Disposition   Discharge    Condition   Stable    Comment   --                  Note Disclaimer: At [facility], we believe that sharing information builds trust and better relationships. You are receiving this note because you recently visited [facility]. It is possible you will see health information before a provider has talked with you about it. This kind of information can be easy to misunderstand. To help you fully understand what it means for your health, we urge you to discuss this note with your provider.         Dolores Delaney PA  07/25/25 5647

## 2025-07-31 ENCOUNTER — APPOINTMENT (OUTPATIENT)
Dept: CT IMAGING | Facility: HOSPITAL | Age: 50
End: 2025-07-31
Payer: MEDICAID

## 2025-07-31 ENCOUNTER — HOSPITAL ENCOUNTER (EMERGENCY)
Facility: HOSPITAL | Age: 50
Discharge: HOME | End: 2025-07-31
Attending: STUDENT IN AN ORGANIZED HEALTH CARE EDUCATION/TRAINING PROGRAM
Payer: MEDICAID

## 2025-07-31 VITALS
TEMPERATURE: 98 F | RESPIRATION RATE: 16 BRPM | OXYGEN SATURATION: 98 % | DIASTOLIC BLOOD PRESSURE: 71 MMHG | HEART RATE: 63 BPM | SYSTOLIC BLOOD PRESSURE: 114 MMHG

## 2025-07-31 DIAGNOSIS — M54.41 ACUTE MIDLINE LOW BACK PAIN WITH BILATERAL SCIATICA: Primary | ICD-10-CM

## 2025-07-31 DIAGNOSIS — M54.42 ACUTE MIDLINE LOW BACK PAIN WITH BILATERAL SCIATICA: Primary | ICD-10-CM

## 2025-07-31 PROCEDURE — 72131 CT LUMBAR SPINE W/O DYE: CPT

## 2025-07-31 PROCEDURE — 99284 EMERGENCY DEPT VISIT MOD MDM: CPT

## 2025-07-31 PROCEDURE — 63710000001 ONDANSETRON ODT 4 MG TABLET DISPERSIBLE: Performed by: STUDENT IN AN ORGANIZED HEALTH CARE EDUCATION/TRAINING PROGRAM

## 2025-07-31 RX ORDER — ONDANSETRON 4 MG/1
4 TABLET, ORALLY DISINTEGRATING ORAL ONCE
Status: DISCONTINUED | OUTPATIENT
Start: 2025-07-31 | End: 2025-07-31 | Stop reason: HOSPADM

## 2025-07-31 RX ORDER — OXYCODONE AND ACETAMINOPHEN 5; 325 MG/1; MG/1
1 TABLET ORAL ONCE
Refills: 0 | Status: DISCONTINUED | OUTPATIENT
Start: 2025-07-31 | End: 2025-07-31 | Stop reason: HOSPADM

## 2025-07-31 NOTE — ED PROVIDER NOTES
MD ATTESTATION NOTE    The LANDON and I have discussed this patient's history, physical exam, MDM, and treatment plan.  I have reviewed the documentation and personally had a face to face interaction with the patient. I affirm the documentation and agree with the treatment and plan.  The attached note describes my personal findings.      I provided a substantive portion of the medical decision making.        Brief HPI: 50-year-old female, presents to the ED for evaluation of generalized muscle pain, left upper back pain, and low back pain radiating down her legs.  She denies any heavy lifting or trauma.  No weakness.  No other complaints at this time.    PHYSICAL EXAM  ED Triage Vitals [07/31/25 1523]   Temp Heart Rate Resp BP SpO2   98 °F (36.7 °C) 77 16 138/77 97 %      Temp src Heart Rate Source Patient Position BP Location FiO2 (%)   -- -- -- -- --         GENERAL: no acute distress  HENT: nares patent, mild left cervical and trapezius paraspinal muscle tenderness to palpation  EYES: no scleral icterus  CV: regular rhythm, normal rate  RESPIRATORY: normal effort  ABDOMEN: soft  MUSCULOSKELETAL: no deformity, mild to moderate thoracic and lumbar paraspinal muscle tenderness to palpation without significant midline bony tenderness  NEURO: alert, moves all extremities, follows commands  PSYCH:  calm, cooperative  SKIN: warm, dry    Vital signs and nursing notes reviewed.        Medical Decision Making:  ED Course as of 07/31/25 1853   Thu Jul 31, 2025   1800 Patient presents withlow back pain with radiation down bilateral legs starting several days ago.  No numbness, weakness.  Denies any injury.  Plan for CT imaging of the lumbar spine. [EE]   1852 CT Lumbar Spine Without Contrast  I reviewed patient's CT image(s), no acute fracture, interpreted by self [DN]      ED Course User Index  [DN] Vlad Casanova MD  [EE] Aneesh Chaney PA       No diagnosis found.    ED Disposition       None               Vlad Casanova  MD RACHEAL  07/31/25 5148

## 2025-07-31 NOTE — ED TRIAGE NOTES
Pt/family reports recent er visit and dx'd with cervical radiculopathy, neck pain that radiates to arm, now having pain in both legs that started today

## 2025-07-31 NOTE — ED PROVIDER NOTES
EMERGENCY DEPARTMENT ENCOUNTER    Room Number:  42/42  Date of encounter:  7/31/2025  PCP: David Garcia APRN  Historian: Patient, friend  Chronic or social conditions impacting care (social determinants of health): None  Friend providing [place] interpretation    HPI:  Chief Complaint: Low back pain with radiation down bilateral legs  A complete HPI/ROS/PMH/PSH/SH/FH are unobtainable due to: Nothing    Context: Jeanie Macdonald is a 50 y.o. female, who presents to the ED c/o acute low back pain with radiation down bilateral legs.  Patient reports that the symptoms started several days ago.  She complains of pain in her low back with radiation down the posterior legs to the feet.  She denies any overt weakness, numbness.  She denies any saddle paresthesias or incontinence.  Patient was actually seen in the ER 5 days ago for neck pain with radiation down to the right arm.  She states that this symptom is improved.    Review of prior external notes (non-ED):   I have reviewed primary care office visit dated 7/2/2025.  Patient seen for epigastric abdominal pain.    Review of prior external test results outside of this encounter:  I have reviewed a CMP dated 1/2/2025.  Potassium 4.2, creatinine 0.73    PAST MEDICAL HISTORY  Active Ambulatory Problems     Diagnosis Date Noted    History of Helicobacter pylori infection 03/08/2021    Internal hemorrhoids 10/06/2022    Colon polyps 10/06/2022    Epigastric pain 08/09/2024    Hiatal hernia 09/05/2024     Resolved Ambulatory Problems     Diagnosis Date Noted    Epigastric pain 03/08/2021    Nausea 03/08/2021    Absence of bladder continence 12/06/2022    Fibroids 12/06/2022    Abnormal uterine bleeding (AUB) 12/14/2022    S/P laparoscopic hysterectomy 02/23/2023     Past Medical History:   Diagnosis Date    Arthritis     GERD (gastroesophageal reflux disease)     H pylori ulcer     Hyperlipidemia          PAST SURGICAL HISTORY  Past Surgical History:   Procedure  Laterality Date    COLONOSCOPY N/A 10/6/2022    Procedure: COLONOSCOPY TO CECUM WITH POLYPECTOMY ( COLD BX);  Surgeon: Jose Pascual MD;  [facility] END[facility];  Service: General;  Laterality: N/A;  SCREENING  POST:COLON POLYP; HEMORRHOIDS    ENDOSCOPY N/A 3/22/2021    Procedure: ESOPHAGOGASTRODUODENOSCOPY WITH COLD BX'S;  Surgeon: Joseph Holden MD;  [facility];  Service: Gastroenterology;  Laterality: N/A;  pre: EPIGASTRIC PAIN, HX H. PYLORI  post: NORMAL    ENDOSCOPY N/A 10/6/2022    Procedure: ESOPHAGOGASTRODUODENOSCOPY WITH BIOPSY;  Surgeon: Jose Pascual MD;  [facility];  Service: General;  Laterality: N/A;  EPIGASTRIC PAIN  POST:HIATAL HERNIA    ENDOSCOPY N/A 9/5/2024    Procedure: ESOPHAGOGASTRODUODENOSCOPY with cold biopsies;  Surgeon: Jose Pascual MD;  [facility];  Service: General;  Laterality: N/A;  Pre - abominal pain.  Post - hiatal hernia    I & D / EXCISION MARSUPIALIZATION BARTHOLIN'S GLAND CYST / LYSIS LESIONS      TOTAL LAPAROSCOPIC HYSTERECTOMY SALPINGO OOPHORECTOMY Bilateral 2/23/2023    Procedure: TOTAL LAPAROSCOPIC HYSTERECTOMY, BILATERAL SALPINGECTOMY, and cystoscopy;  Surgeon: Gina Masterson MD;  Location: [facility] MAIN OR;  Service: Obstetrics/Gynecology;  Laterality: Bilateral;         FAMILY HISTORY  Family History   Problem Relation Age of Onset    Arthritis Mother     Hypertension Mother     Throat cancer Father     Lung cancer Sister     Colon cancer Other     Breast cancer Neg Hx     Uterine cancer Neg Hx     Ovarian cancer Neg Hx     Malig Hyperthermia Neg Hx          SOCIAL HISTORY  Social History     Socioeconomic History    Marital status: Single   Tobacco Use    Smoking status: Never     Passive exposure: Never    Smokeless tobacco: Never   Vaping Use    Vaping status: Never Used   Substance and Sexual Activity    Alcohol use: Not Currently    Drug use: Never    Sexual activity: Not  Currently     Partners: Male     Birth control/protection: None, Hysterectomy         ALLERGIES  Patient has no known allergies.        REVIEW OF SYSTEMS  All systems reviewed and negative except for those discussed in HPI.       PHYSICAL EXAM    I have reviewed the triage vital signs and nursing notes.    ED Triage Vitals [07/31/25 1523]   Temp Heart Rate Resp BP SpO2   98 °F (36.7 °C) 77 16 138/77 97 %      Temp src Heart Rate Source Patient Position BP Location FiO2 (%)   -- -- -- -- --       Physical Exam  GENERAL: Alert, oriented, not distressed  HENT: head atraumatic, mild posterior cervical tenderness  EYES: no scleral icterus, EOMI  CV: regular rhythm, regular rate, no murmur  RESPIRATORY: normal effort, CTA  ABDOMEN: soft, nontender  MUSCULOSKELETAL: Mild tenderness over the lumbar spine.  Preserved range of motion of all extremities.  NEURO: alert, 5/5 strength in bilateral lower extremities.  Ambulates well.  2+ patellar reflexes.  SKIN: warm, dry      RADIOLOGY  CT Lumbar Spine Without Contrast  Result Date: 7/31/2025  CT LUMBAR SPINE without contrast..  HISTORY: Back pain radiating to both legs.  TECHNIQUE: Routine lumbar spine CT without contrast..   Radiation dose reduction techniques were utilized, including automated exposure control, and exposure modulation based on body size.  COMPARISON: None..  FINDINGS:  Spinal alignment is normal.  There is no bone erosion or destruction, or evidence of acute or chronic fracture.   The paraspinous soft tissues are unremarkable.  There is no spinal canal or foraminal stenosis at any level. Intervertebral disc heights are well-preserved.       Normal lumbar spine CT.     This report was finalized on 7/31/2025 7:08 PM by Dr. Kulwinder Patel M.D on Workstation: [id number]        I ordered the above noted radiological studies. Reviewed by me and discussed with radiologist.  See dictation for official radiology interpretation.      MEDICATIONS GIVEN IN  ER    Medications   oxyCODONE-acetaminophen (PERCOCET) 5-325 MG per tablet 1 tablet (1 tablet Oral Not Given 7/31/25 1831)   ondansetron ODT (ZOFRAN-ODT) disintegrating tablet 4 mg (4 mg Oral Not Given 7/31/25 1831)         ADDITIONAL ORDERS CONSIDERED BUT NOT ORDERED:  Admission was considered but after careful review of the patient's presentation, physical examination, diagnostic results, and response to treatment the patient may be safely discharged with outpatient follow-up.       PROGRESS, DATA ANALYSIS, CONSULTS, AND MEDICAL DECISION MAKING    All labs have been independently interpreted by myself.  All radiology studies have been independently interpreted by myself and discussed with radiologist dictating the report.   EKGs independently interpreted by myself.  Discussion below represents my analysis of pertinent findings related to patient's condition, differential diagnosis, treatment plan and final disposition.    I have discussed case with Dr. Casanova, emergency room physician.  He has performed his own bedside examination and agrees with treatment plan.    ED Course as of 07/31/25 1953   Thu Jul 31, 2025   1800 Patient presents withlow back pain with radiation down bilateral legs starting several days ago.  No numbness, weakness.  Denies any injury.  Plan for CT imaging of the lumbar spine. [EE]   1852 CT Lumbar Spine Without Contrast  I reviewed patient's CT image(s), no acute fracture, interpreted by self [DN]   1951 I have updated the patient on workup.  She certainly has no concerning neurodeficits, fever.  She has fairly widespread pain throughout her entire body.  I believe she is safe for discharge.  She does have steroids, muscle relaxers from last visit.  I will have her follow-up with her primary care doctor. [EE]      ED Course User Index  [DN] Vlad Casanova MD  [EE] Aneesh Chaney PA       AS OF 19:53 EDT VITALS:    BP - 114/69  HR - 62  TEMP - 98 °F (36.7 °C)  O2 SATS -  96%        DIAGNOSIS  Final diagnoses:   Acute midline low back pain with bilateral sciatica         DISPOSITION  Discharged    Admission was considered but after careful review of the patient's presentation, physical examination, diagnostic results, and response to treatment the patient may be safely discharged with outpatient follow-up.         Dictated utilizing Dragon dictation     Aneesh Chaney PA  07/31/25 1953

## 2025-08-07 ENCOUNTER — OFFICE VISIT (OUTPATIENT)
Dept: NEUROSURGERY | Facility: CLINIC | Age: 50
End: 2025-08-07
Payer: COMMERCIAL

## 2025-08-07 VITALS
WEIGHT: 157 LBS | TEMPERATURE: 98.6 F | SYSTOLIC BLOOD PRESSURE: 120 MMHG | BODY MASS INDEX: 30.82 KG/M2 | DIASTOLIC BLOOD PRESSURE: 72 MMHG | HEIGHT: 60 IN | OXYGEN SATURATION: 97 % | HEART RATE: 74 BPM | RESPIRATION RATE: 16 BRPM

## 2025-08-07 DIAGNOSIS — M79.89 RIGHT AXILLARY SWELLING: ICD-10-CM

## 2025-08-07 DIAGNOSIS — M54.2 ACUTE NECK PAIN: Primary | ICD-10-CM

## 2025-08-07 DIAGNOSIS — M50.30 DEGENERATION OF CERVICAL INTERVERTEBRAL DISC: ICD-10-CM

## 2025-08-07 RX ORDER — LEVOCETIRIZINE DIHYDROCHLORIDE 5 MG/1
5 TABLET, FILM COATED ORAL DAILY
COMMUNITY
Start: 2025-05-27 | End: 2025-08-25

## 2025-08-07 RX ORDER — ERGOCALCIFEROL 1.25 MG/1
50000 CAPSULE, LIQUID FILLED ORAL
COMMUNITY
Start: 2025-05-27 | End: 2025-08-25

## 2025-08-07 RX ORDER — ESTRADIOL 0.1 MG/G
0.5 CREAM VAGINAL 3 TIMES WEEKLY
COMMUNITY
Start: 2025-07-02 | End: 2026-07-02

## 2025-08-07 RX ORDER — SOLIFENACIN SUCCINATE 10 MG/1
10 TABLET, FILM COATED ORAL DAILY
COMMUNITY
Start: 2025-05-27 | End: 2025-08-25

## 2025-08-07 RX ORDER — SERTRALINE HYDROCHLORIDE 25 MG/1
25 TABLET, FILM COATED ORAL DAILY
COMMUNITY
Start: 2025-05-27 | End: 2025-08-25

## 2025-08-07 RX ORDER — DICLOFENAC SODIUM 75 MG/1
75 TABLET, DELAYED RELEASE ORAL
COMMUNITY
Start: 2025-05-27 | End: 2025-11-23

## 2025-08-07 RX ORDER — OMEPRAZOLE 40 MG/1
1 CAPSULE, DELAYED RELEASE ORAL DAILY
COMMUNITY
Start: 2025-05-27

## (undated) DEVICE — TROCAR: Brand: KII OPTICAL ACCESS SYSTEM

## (undated) DEVICE — LAPAROVUE VISIBILITY SYSTEM LAPAROSCOPIC SOLUTIONS: Brand: LAPAROVUE

## (undated) DEVICE — KT ORCA ORCAPOD DISP STRL

## (undated) DEVICE — MANIP UTER RUMI TP 6.7MMX8CM BLU

## (undated) DEVICE — SOL NACL 0.9PCT 1000ML

## (undated) DEVICE — TOTAL TRAY, 16FR 10ML SIL FOLEY, URN: Brand: MEDLINE

## (undated) DEVICE — 3M™ STERI-DRAPE™ INSTRUMENT POUCH 1018L: Brand: STERI-DRAPE™

## (undated) DEVICE — TUBING, SUCTION, 1/4" X 10', STRAIGHT: Brand: MEDLINE

## (undated) DEVICE — LAPAROSCOPIC SMOKE FILTRATION SYSTEM: Brand: PALL LAPAROSHIELD® PLUS LAPAROSCOPIC SMOKE FILTRATION SYSTEM

## (undated) DEVICE — SUTURING DEVICE: Brand: ENDO STITCH

## (undated) DEVICE — ENDOCUT SCISSOR TIP, DISPOSABLE: Brand: RENEW

## (undated) DEVICE — COVER,MAYO STAND,STERILE: Brand: MEDLINE

## (undated) DEVICE — LOU GYN LAPAROSCOPY: Brand: MEDLINE INDUSTRIES, INC.

## (undated) DEVICE — APPL CHLORAPREP HI/LITE 26ML ORNG

## (undated) DEVICE — SENSR O2 OXIMAX FNGR A/ 18IN NONSTR

## (undated) DEVICE — SYRINGE, LUER LOCK, 60ML: Brand: MEDLINE

## (undated) DEVICE — ADAPT CLN BIOGUARD AIR/H2O DISP

## (undated) DEVICE — SUT MNCRYL PLS ANTIB UD 4/0 PS2 18IN

## (undated) DEVICE — SYR LUERLOK 5CC

## (undated) DEVICE — MSK ENDO PORT O2 POM ELITE CURAPLEX A/

## (undated) DEVICE — HARMONIC ACE +7 LAPAROSCOPIC SHEARS ADVANCED HEMOSTASIS 5MM DIAMETER 36CM SHAFT LENGTH  FOR USE WITH GRAY HAND PIECE ONLY: Brand: HARMONIC ACE

## (undated) DEVICE — 2, DISPOSABLE SUCTION/IRRIGATOR WITH DISPOSABLE TIP: Brand: STRYKEFLOW

## (undated) DEVICE — SPNG GZ WOVN 4X4IN 12PLY 10/BX STRL

## (undated) DEVICE — TROCAR: Brand: KII FIOS FIRST ENTRY

## (undated) DEVICE — FRCP BX RADJAW4 NDL 2.8 240CM LG OG BX40

## (undated) DEVICE — MSK PROC CURAPLEX O2 2/ADAPT 7FT

## (undated) DEVICE — BITEBLOCK OMNI BLOC

## (undated) DEVICE — GLV SURG SENSICARE PI MIC PF SZ6.5 LF STRL

## (undated) DEVICE — LN SMPL CO2 SHTRM SD STREAM W/M LUER

## (undated) DEVICE — IRRIGATOR BULB ASEPTO 60CC STRL

## (undated) DEVICE — UNDYED BRAIDED (POLYGLACTIN 910), SYNTHETIC ABSORBABLE SUTURE: Brand: COATED VICRYL

## (undated) DEVICE — CANN O2 ETCO2 FITS ALL CONN CO2 SMPL A/ 7IN DISP LF

## (undated) DEVICE — ENDOPATH PNEUMONEEDLE INSUFFLATION NEEDLES WITH LUER LOCK CONNECTORS 120MM: Brand: ENDOPATH

## (undated) DEVICE — ENDOPATH XCEL BLADELESS TROCARS WITH STABILITY SLEEVES: Brand: ENDOPATH XCEL

## (undated) DEVICE — LAPAROSCOPIC DISSECTOR: Brand: DEROYAL

## (undated) DEVICE — BLCK/BITE BLOX W/DENTL/RIM W/STRAP 54F

## (undated) DEVICE — TROCAR: Brand: KII SLEEVE

## (undated) DEVICE — GLV SURG BIOGEL LTX PF 6 1/2

## (undated) DEVICE — MANIP UTER RUMI 2 KOH EFFICIENT SS CP 3CM